# Patient Record
Sex: MALE | Race: BLACK OR AFRICAN AMERICAN | NOT HISPANIC OR LATINO | Employment: FULL TIME | URBAN - METROPOLITAN AREA
[De-identification: names, ages, dates, MRNs, and addresses within clinical notes are randomized per-mention and may not be internally consistent; named-entity substitution may affect disease eponyms.]

---

## 2018-01-10 NOTE — MISCELLANEOUS
Provider Comments  Provider Comments:   pt no showed today      Signatures   Electronically signed by : Ivana Sinclair, ; Mar  2 2016  4:13PM EST                       (Author)    Electronically signed by : Christoper Blizzard, MD; Mar  3 2016  1:15PM EST                       (Author)    Electronically signed by : Christoper Blizzard, MD; Mar  3 2016  1:15PM EST                       (Author)    Electronically signed by : GENOVEVA Bolton ; Mar  8 2016 10:06AM EST                       (Author)

## 2018-01-11 NOTE — MISCELLANEOUS
Message   Recorded as Task   Date: 05/16/2016 10:49 AM, Created By: Marcos Lawler   Task Name: Follow Up   Assigned To: Onesimo Cowart procedure,Team   Regarding Patient: Mike Sutton, Status: In Progress   Comment:    Cate Parmar - 16 May 2016 10:49 AM     TASK CREATED  Pt is S/P RIGHT SIJ INJ on 5/10/16 by Dr Rakan Lowe  No pain diary scanned in  No f/u scheduled   Gwen Dumont - 17 May 2016 2:09 PM     TASK IN [address] [place] [address] - 17 May 2016 2:12 PM     TASK EDITED  Spoke with pt  who reports a 90% improvement post injection  He states he did have some numbness in his right leg but this has gone away  Denies any s/s of infection  Jamie Garcia - 17 May 2016 3:00 PM     TASK REPLIED TO: Previously Assigned To Jamie Garcia md aware   will call pt with MRI results once that is done        Active Problems    1  Borderline hyperlipidemia (272 4) (E78 5)   2  Foraminal stenosis of lumbar region (724 02) (M99 83)   3  Fracture of lumbar vertebra (805 4) (S32 009A)   4  Low back pain (724 2) (M54 5)   5  Lumbar spondylolysis (738 4) (M43 06)   6  Mid back pain (724 5) (M54 9)   7  Musculoskeletal pain (729 1) (M79 1)   8  Other intervertebral disc displacement, lumbosacral region (722 10) (M51 27)   9  Radicular pain of left lower extremity (724 4) (M54 10)   10  Sacroiliitis (720 2) (M46 1)   11  Tobacco dependence (305 1) (F17 200)    Current Meds   1  Gabapentin 100 MG Oral Capsule; TAKE 1 CAPSULE 3 TIMES DAILY; Therapy: 45ZUA8876 to (Evaluate:05Jun2016); Last Rx:54Gxp6797 Ordered   2  Ibuprofen 600 MG Oral Tablet; Therapy: 60GQS7866 to Recorded   3  TiZANidine HCl - 4 MG Oral Tablet; TAKE 1 TABLET AT BEDTIME; Therapy: 56Kbi4834 to (Evaluate:24Jun2016)  Requested for: 29Vrk4503; Last   Rx:[id number] Ordered    Allergies    1  No Known Drug Allergies    2   No Known Environmental Allergies    Signatures   Electronically signed by : Ciera Moore, ; May 18 2016  1:51PM EST (Author)

## 2018-01-12 NOTE — MISCELLANEOUS
Message  Return to work or school:   Margarette Brian is under my professional care  He was seen in my office on 4/6/16    He is not able to return to work until Evaluated by specialist      Patient is under my care for medical reasons  He was unable to work October 7 until now  He will be seeing a specialist who will determine his future work capabilities  No work until seen/evaluated by specialist    Donzella Mccreedy Colletta Schmitt, MD       Signatures   Electronically signed by : Daniel Damon MD; Apr 6 2016 11:54AM EST                       (Author)    Electronically signed by : Daniel Damon MD; Apr 7 2016 12:54PM EST                       (Author)    Electronically signed by : Daniel Damon MD; Apr 7 2016 12:54PM EST                       (Author)    Electronically signed by : Daniel Damon MD; Apr 7 2016 12:55PM EST                       (Author)    Electronically signed by : Daniel Damon MD; Apr 7 2016 12:56PM EST                       (Author)    Electronically signed by : Daniel Damon MD; Apr 12 2016  3:28PM EST                       (Author)    Electronically signed by : Daniel Damon MD; Apr 12 2016  3:28PM EST                       (Author)    Electronically signed by : GENOVEVA Razo ; Apr 13 2016  9:25AM EST                       (Author)

## 2018-01-17 NOTE — MISCELLANEOUS
Message   Recorded as Task   Date: 05/18/2016 02:44 PM, Created By: Miguel Damon   Task Name: Follow Up   Assigned To: KENNEY Greco   Regarding Patient: Nabila Carter, Status: In Progress   CommentCoyamile Aaron - 18 May 2016 2:44 PM     TASK CREATED  discussed MRI results with pt   doing well after right SIJ injection   will f/u PRN   please mail copy of pt's MRI report to pt   Jennifer Nolasco - 18 May 2016 3:15 PM     TASK IN [facility] [employer] - 18 May 2016 3:17 PM     TASK EDITED  Copy of pt's MRI mailed to pt's home address  Active Problems    1  Borderline hyperlipidemia (272 4) (E78 5)   2  Foraminal stenosis of lumbar region (724 02) (M99 83)   3  Fracture of lumbar vertebra (805 4) (S32 009A)   4  Low back pain (724 2) (M54 5)   5  Lumbar spondylolysis (738 4) (M43 06)   6  Mid back pain (724 5) (M54 9)   7  Musculoskeletal pain (729 1) (M79 1)   8  Other intervertebral disc displacement, lumbosacral region (722 10) (M51 27)   9  Radicular pain of left lower extremity (724 4) (M54 10)   10  Sacroiliitis (720 2) (M46 1)   11  Tobacco dependence (305 1) (F17 200)    Current Meds   1  Gabapentin 100 MG Oral Capsule; TAKE 1 CAPSULE 3 TIMES DAILY; Therapy: 86YXG6309 to (Evaluate:05Jun2016); Last Rx:[id number] Ordered   2  Ibuprofen 600 MG Oral Tablet; Therapy: 47GIA4402 to Recorded   3  TiZANidine HCl - 4 MG Oral Tablet; TAKE 1 TABLET AT BEDTIME; Therapy: 12Xtk5052 to (Evaluate:24Jun2016)  Requested for: 48Tyu4228; Last   Rx:[id number] Ordered    Allergies    1  No Known Drug Allergies    2   No Known Environmental Allergies    Signatures   Electronically signed by : Valery Purvis, ; May 18 2016  3:17PM EST                       (Author)

## 2018-01-17 NOTE — PROGRESS NOTES
Assessment    1  Encounter for preventive health examination (V70 0) (Z00 00)   2  Borderline hyperlipidemia (272 4) (E78 5)   3  Low back pain (724 2) (M54 5)    Plan  Borderline hyperlipidemia    · (1) LIPID PANEL FASTING W DIRECT LDL REFLEX; Status:Active; Requested  for:17Hkj5558;    · (1) TSH; Status:Active; Requested for:82Cij5498; Health Maintenance, Low back pain    · (1) COMPREHENSIVE METABOLIC PANEL; Status:Active; Requested for:36Cla3472;   Low back pain    · (1) CBC/PLT/DIFF; Status:Active; Requested for:56Ocs9066;   Musculoskeletal pain    · (1) VITAMIN D 25-HYDROXY; Status:Active; Requested for:72Bdw9049;     Discussion/Summary  Impression: healthy adult male  Currently, he eats a healthy diet and has an adequate exercise regimen  Prostate cancer screening: the risks and benefits of prostate cancer screening were discussed and Order at age 50  Testicular cancer screening: the risks and benefits of testicular cancer screening were discussed and self testicular exam technique was taught  Screening lab work includes glucose, lipid profile and 25-hydroxyvitamin D  The risks and benefits of immunizations were discussed and immunizations are up to date  Advice and education were given regarding nutrition, aerobic exercise, weight bearing exercise, reproductive health, cardiovascular risk reduction, tobacco cessation, alcohol use, sunscreen use, self skin examination, helmet use and seat belt use  Patient discussion: discussed with the patient  Patient is a healthy male  He has stopped smoking cigarettes, which is excellent  We will check his cbc/cmp/tsh/lipids/vitamin D levels  Greg Dill will continue back pain regimen with Dr Iona Lr as well as myself  Will f/u w/ MRI results after available  Patient to f/u prn and requested Dr Zonia Willingham as his new PCP starting in July  Possible side effects of new medications were reviewed with the patient/guardian today     The patient was counseled regarding diagnostic results, instructions for management, risk factor reductions, prognosis, patient and family education, impressions, risks and benefits of treatment options, importance of compliance with treatment  Chief Complaint  Back Pain  Yearly Physical      History of Present Illness  HM, Adult Male: The patient is being seen for a health maintenance and Yearly physical evaluation  The last health maintenance visit was 3 year(s) ago  Social History: Household members include domestic partner  Work status: not currently employed  The patient quit smoking 4 weeks  He has 1ppd/10 years pack year(s) of cigarette use  He reports rare alcohol use  He has never used illicit drugs  General Health: The patient's health since the last visit is described as good  He has regular dental visits  He complains of vision problems  He has hearing loss  Immunizations status: up to date  Lifestyle:  He consumes a diverse and healthy diet  He does not have any weight concerns  He exercises regularly  He does not use tobacco  He consumes alcohol  He denies drug use  Reproductive health:  the patient is sexually active  birth control is not being practiced  He denies erectile dysfunction  Screening:   HPI: Here for yearly physical    Had MRI for low back pain and scaitica today, will f/u with results  Review of Systems    Constitutional: No fever or chills, feels well, no tiredness, no recent weight gain or weight loss  Eyes: No complaints of eye pain, no red eyes, no discharge from eyes, no itchy eyes  ENT: no complaints of earache, no hearing loss, no nosebleeds, no nasal discharge, no sore throat, no hoarseness  Cardiovascular: No complaints of slow heart rate, no fast heart rate, no chest pain, no palpitations, no leg claudication, no lower extremity  Respiratory: No complaints of shortness of breath, no wheezing, no cough, no SOB on exertion, no orthopnea or PND     Gastrointestinal: No complaints of abdominal pain, no constipation, no nausea or vomiting, no diarrhea or bloody stools  Genitourinary: No complaints of dysuria, no incontinence, no hesitancy, no nocturia, no genital lesion, no testicular pain  Musculoskeletal: as noted in HPI  Integumentary: No complaints of skin rash or skin lesions, no itching, no skin wound, no dry skin  Neurological: as noted in HPI  Psychiatric: Is not suicidal, no sleep disturbances, no anxiety or depression, no change in personality, no emotional problems  Endocrine: No complaints of proptosis, no hot flashes, no muscle weakness, no erectile dysfunction, no deepening of the voice, no feelings of weakness  Hematologic/Lymphatic: No complaints of swollen glands, no swollen glands in the neck, does not bleed easily, no easy bruising  ROS reviewed  Active Problems    1  Foraminal stenosis of lumbar region (724 02) (M99 83)   2  Fracture of lumbar vertebra (805 4) (S32 009A)   3  Low back pain (724 2) (M54 5)   4  Lumbar spondylolysis (738 4) (M43 06)   5  Mid back pain (724 5) (M54 9)   6  Other intervertebral disc displacement, lumbosacral region (722 10) (M51 27)   7  Radicular pain of left lower extremity (724 4) (M54 10)   8  Sacroiliitis (720 2) (M46 1)   9  Tobacco dependence (305 1) (F17 200)    Family History  Mother    · No pertinent family history    Social History    · Always uses seat belt   · Current every day smoker (305 1) (F17 200)   · No alcohol use   · No drug use    Current Meds   1  Gabapentin 100 MG Oral Capsule; TAKE 1 CAPSULE 3 TIMES DAILY; Therapy: 60MEH0889 to (Evaluate:05Jun2016); Last Rx:06Apr2016 Ordered   2  TiZANidine HCl - 4 MG Oral Tablet; TAKE 1 TABLET AT BEDTIME; Therapy: 43Dac2649 to (Evaluate:24Jun2016)  Requested for: 25Apr2016; Last   Rx:25Apr2016 Ordered    Allergies    1  No Known Drug Allergies    2   No Known Environmental Allergies    Vitals   Recorded: 20PFU6796 09:17AM   Temperature 97 8 F   Heart Rate 72 Respiration 14   Systolic 015   Diastolic 72   Height 6 ft 0 2 in   Weight 228 lb 2 08 oz   BMI Calculated 30 77   BSA Calculated 2 25   Pain Scale 5     Physical Exam    Constitutional   General appearance: No acute distress, well appearing and well nourished  Head and Face   Head and face: Normal     Palpation of the face and sinuses: No sinus tenderness  Eyes   Conjunctiva and lids: No erythema, swelling or discharge  Pupils and irises: Equal, round, reactive to light  Ophthalmoscopic examination: Normal fundi and optic discs  Ears, Nose, Mouth, and Throat   External inspection of ears and nose: Normal     Otoscopic examination: Tympanic membranes translucent with normal light reflex  Canals patent without erythema  Hearing: Normal     Nasal mucosa, septum, and turbinates: Normal without edema or erythema  Lips, teeth, and gums: Normal, good dentition  Oropharynx: Normal with no erythema, edema, exudate or lesions  Neck   Neck: Supple, symmetric, trachea midline, no masses  Thyroid: Normal, no thyromegaly  Pulmonary   Respiratory effort: No increased work of breathing or signs of respiratory distress  Palpation of chest: Normal     Auscultation of lungs: Clear to auscultation  Cardiovascular   Palpation of heart: Normal PMI, no thrills  Auscultation of heart: Normal rate and rhythm, normal S1 and S2, no murmurs  Carotid pulses: 2+ bilaterally  Examination of extremities for edema and/or varicosities: Normal     Chest   Chest: Normal     Abdomen   Abdomen: Non-tender, no masses  Liver and spleen: No hepatomegaly or splenomegaly  Examination for hernias: No hernias appreciated  Genitourinary   Penis: Normal, no lesions  Digital rectal exam of prostate: Normal size, no masses  Lymphatic   Palpation of lymph nodes in neck: No lymphadenopathy  Palpation of lymph nodes in groin: No lymphadenopathy      Musculoskeletal   Gait and station: Normal  Inspection/palpation of digits and nails: Normal without clubbing or cyanosis  Range of motion: Normal     Stability: Normal     Skin   Skin and subcutaneous tissue: Normal without rashes or lesions  Palpation of skin and subcutaneous tissue: Normal turgor  Neurologic   Cranial nerves: Cranial nerves 2-12 intact  Reflexes: 2+ and symmetric  Sensation: No sensory loss  Psychiatric   Judgment and insight: Normal     Orientation to person, place and time: Normal     Recent and remote memory: Intact  Mood and affect: Normal        Attending Note  Attending Note: Attending Note: I discussed the case with the Resident and reviewed the Resident's note, I supervised the Resident and I agree with the Resident management plan as it was presented to me  Level of Participation: I was present in clinic, but did not examine the patient  Comments/Additional Findings: LBP, no re flag sx, seeing pain mgmt  I agree with the Resident's note        Signatures   Electronically signed by : Zoya Mukherjee MD; May 18 2016  9:57AM EST                       (Author)    Electronically signed by : GENOVEVA Chaudhari ; May 18 2016  1:09PM EST                       (Author)

## 2019-01-22 ENCOUNTER — OFFICE VISIT (OUTPATIENT)
Dept: FAMILY MEDICINE CLINIC | Facility: CLINIC | Age: 49
End: 2019-01-22

## 2019-01-22 VITALS
BODY MASS INDEX: 30.13 KG/M2 | SYSTOLIC BLOOD PRESSURE: 118 MMHG | HEART RATE: 64 BPM | HEIGHT: 71 IN | RESPIRATION RATE: 16 BRPM | WEIGHT: 215.2 LBS | TEMPERATURE: 98.1 F | DIASTOLIC BLOOD PRESSURE: 78 MMHG

## 2019-01-22 DIAGNOSIS — Z77.21 EXPOSURE TO POTENTIALLY HAZARDOUS BODY FLUIDS: ICD-10-CM

## 2019-01-22 DIAGNOSIS — E66.09 CLASS 1 OBESITY DUE TO EXCESS CALORIES WITHOUT SERIOUS COMORBIDITY WITH BODY MASS INDEX (BMI) OF 30.0 TO 30.9 IN ADULT: ICD-10-CM

## 2019-01-22 DIAGNOSIS — F17.200 TOBACCO DEPENDENCE: Primary | ICD-10-CM

## 2019-01-22 PROBLEM — E66.811 CLASS 1 OBESITY DUE TO EXCESS CALORIES WITHOUT SERIOUS COMORBIDITY WITH BODY MASS INDEX (BMI) OF 30.0 TO 30.9 IN ADULT: Status: ACTIVE | Noted: 2019-01-22

## 2019-01-22 PROCEDURE — 99213 OFFICE O/P EST LOW 20 MIN: CPT | Performed by: FAMILY MEDICINE

## 2019-01-22 RX ORDER — TIZANIDINE 4 MG/1
1 TABLET ORAL
COMMUNITY
Start: 2016-04-25 | End: 2020-08-05

## 2019-01-22 RX ORDER — GABAPENTIN 100 MG/1
1 CAPSULE ORAL 3 TIMES DAILY
COMMUNITY
Start: 2015-10-28

## 2019-01-22 RX ORDER — IBUPROFEN 600 MG/1
TABLET ORAL
COMMUNITY
Start: 2016-03-08 | End: 2020-08-05

## 2019-01-22 NOTE — PROGRESS NOTES
Assessment/Plan: Fatimah Montejo is a 57 y o  male with:   Problem List Items Addressed This Visit        Other    Tobacco dependence - Primary     Discussed with patient the risks of continued smoking  Discussed his current use pattern 1 ppd  I advised patient to quit, and offered support  Patient expressed understanding, is pre-contemplative at this time  Asked patient to inform me when he sets a quit date  Offered assistance for whenever he is ready to quit, including counseling and/or pharmacotherapy  Resources discussed with and provided to the patient:  -1-800-QUIT-NOW Phone Number for [employer]  -[employer] ([address] [facility])            Class 1 obesity due to excess calories without serious comorbidity with body mass index (BMI) of 30 0 to 30 9 in adult     Wt Readings from Last 3 Encounters:   01/22/19 97 6 kg (215 lb 3 2 oz)   05/18/16 103 kg (228 lb 2 oz)   05/18/16 104 kg (230 lb)     BMI Counseling: Body mass index is 30 18 kg/m²  Discussed the patient's BMI with him  The BMI is above average  BMI counseling and education was provided to the patient  Nutrition recommendations include reducing portion sizes, decreasing overall calorie intake, 3-5 servings of fruits/vegetables daily, consuming healthier snacks, decreasing soda and/or juice intake, moderation in carbohydrate intake and reducing intake of cholesterol  Exercise recommendations include moderate aerobic physical activity for 150 minutes/week and exercising 3-5 times per week  Relevant Orders    Lipid Panel with Direct LDL reflex    Comprehensive metabolic panel    Exposure to potentially hazardous body fluids    Relevant Orders    Hepatitis B surface antigen    Chlamydia/GC amplified DNA by PCR    HIV 1/2 AG-AB combo          Chief Complaint:  Chief Complaint   Patient presents with    Establish Care     Need new PCP       HPI:   Fatimah Montejo is a 57 y o  male here to establish as new patient   Patient is a   Patient has not been following with a doctor in a few years and has not had any blood work done  Patient denies any chronic medical conditions or taking any medications  Patient wears glasses for near sightedness, denies any changes in prescription  Otherwise he's reports good health  He reports active lifestyle, but reports poor diet  He's a current 1PPD smoker 18 years  He drinks occasional beer and no marijuana or recreational drug use  Today, he is concerned because he thinks he may have come in contact with potentially hazardous bodily fluids  He is not willing to discuss in detail  He denies any active lesions or symptoms  Of note patient has chronic back pain secondary to fracture of lumbar vertebra, intervertebral disk injury, and after falling off a ladder, spinal stenosis  He used to follow up with orthopedic surgery, but reports overall pain is intermittent        History:   Past Medical History:   Diagnosis Date    Collapse of left lung     28     Social History   Substance Use Topics    Smoking status: Current Every Day Smoker     Packs/day: 1 00     Years: 1 00     Types: Cigarettes    Smokeless tobacco: Never Used    Alcohol use Yes      Comment: Pt "very little'     Patient Active Problem List   Diagnosis    Borderline hyperlipidemia    Foraminal stenosis of lumbar region    Fracture of lumbar vertebra (HCC)    Low back pain    Other intervertebral disc displacement, lumbosacral region    Tobacco dependence    Class 1 obesity due to excess calories without serious comorbidity with body mass index (BMI) of 30 0 to 30 9 in adult    Exposure to potentially hazardous body fluids       ROS:  As Per HPI    Physical Exam:  /78 (BP Location: Left arm, Patient Position: Sitting, Cuff Size: Adult)   Pulse 64   Temp 98 1 °F (36 7 °C) (Tympanic)   Resp 16   Ht 5' 10 8" (1 798 m)   Wt 97 6 kg (215 lb 3 2 oz)   BMI 30 18 kg/m²   Physical Exam Constitutional: He is oriented to person, place, and time  No distress  obese   HENT:   Head: Normocephalic and atraumatic  Right Ear: External ear normal    Left Ear: External ear normal    Nose: Nose normal    Mouth/Throat: Oropharynx is clear and moist    Eyes: Pupils are equal, round, and reactive to light  Conjunctivae are normal  Right eye exhibits no discharge  Left eye exhibits no discharge  Neck: Neck supple  No thyromegaly present  Cardiovascular: Normal rate, regular rhythm and normal heart sounds  No murmur heard  Pulmonary/Chest: Effort normal and breath sounds normal  No respiratory distress  He has no wheezes  He has no rales  Abdominal: Soft  Normal appearance and bowel sounds are normal  There is no tenderness  Musculoskeletal: He exhibits no edema or tenderness  Lymphadenopathy:     He has no cervical adenopathy  Neurological: He is alert and oriented to person, place, and time  Skin: Skin is warm and dry  He is not diaphoretic  Psychiatric: He has a normal mood and affect  Vitals reviewed          Future Appointments  Date Time Provider Department Center   2/12/2019 5:20 PM MD Hubert Doan MD

## 2019-01-22 NOTE — ASSESSMENT & PLAN NOTE
Wt Readings from Last 3 Encounters:   01/22/19 97 6 kg (215 lb 3 2 oz)   05/18/16 103 kg (228 lb 2 oz)   05/18/16 104 kg (230 lb)     BMI Counseling: Body mass index is 30 18 kg/m²  Discussed the patient's BMI with him  The BMI is above average  BMI counseling and education was provided to the patient  Nutrition recommendations include reducing portion sizes, decreasing overall calorie intake, 3-5 servings of fruits/vegetables daily, consuming healthier snacks, decreasing soda and/or juice intake, moderation in carbohydrate intake and reducing intake of cholesterol  Exercise recommendations include moderate aerobic physical activity for 150 minutes/week and exercising 3-5 times per week

## 2019-01-22 NOTE — ASSESSMENT & PLAN NOTE
Discussed with patient the risks of continued smoking  Discussed his current use pattern 1 ppd  I advised patient to quit, and offered support  Patient expressed understanding, is pre-contemplative at this time  Asked patient to inform me when he sets a quit date  Offered assistance for whenever he is ready to quit, including counseling and/or pharmacotherapy  Resources discussed with and provided to the patient:  -1-800-QUIT-NOW Phone Number for [employer]  -[employer] ([address]) Daily Assessment

## 2019-01-23 ENCOUNTER — TELEPHONE (OUTPATIENT)
Dept: FAMILY MEDICINE CLINIC | Facility: CLINIC | Age: 49
End: 2019-01-23

## 2019-01-23 DIAGNOSIS — Z77.21 EXPOSURE TO POTENTIALLY HAZARDOUS BODY FLUIDS: Primary | ICD-10-CM

## 2019-01-23 NOTE — TELEPHONE ENCOUNTER
Spoke with patient on the phone at [phone], at appriximately 11:55 am today and updated him regarding pending lab work

## 2019-01-29 LAB
ALBUMIN SERPL-MCNC: 4 G/DL (ref 3.6–5.1)
ALBUMIN/GLOB SERPL: 1.5 (CALC) (ref 1–2.5)
ALP SERPL-CCNC: 79 U/L (ref 40–115)
ALT SERPL-CCNC: 11 U/L (ref 9–46)
AST SERPL-CCNC: 13 U/L (ref 10–40)
BILIRUB SERPL-MCNC: 0.4 MG/DL (ref 0.2–1.2)
BUN SERPL-MCNC: 9 MG/DL (ref 7–25)
BUN/CREAT SERPL: ABNORMAL (CALC) (ref 6–22)
C TRACH RRNA SPEC QL NAA+PROBE: NOT DETECTED
CALCIUM SERPL-MCNC: 9.3 MG/DL (ref 8.6–10.3)
CHLORIDE SERPL-SCNC: 106 MMOL/L (ref 98–110)
CHOLEST SERPL-MCNC: 142 MG/DL
CHOLEST/HDLC SERPL: 4.1 (CALC)
CO2 SERPL-SCNC: 30 MMOL/L (ref 20–32)
CREAT SERPL-MCNC: 1.1 MG/DL (ref 0.6–1.35)
GLOBULIN SER CALC-MCNC: 2.7 G/DL (CALC) (ref 1.9–3.7)
GLUCOSE SERPL-MCNC: 100 MG/DL (ref 65–99)
HBV SURFACE AG SERPL QL IA: NORMAL
HDLC SERPL-MCNC: 35 MG/DL
HIV 1+2 AB+HIV1 P24 AG SERPL QL IA: NORMAL
LDLC SERPL CALC-MCNC: 92 MG/DL (CALC)
N GONORRHOEA RRNA SPEC QL NAA+PROBE: NOT DETECTED
NONHDLC SERPL-MCNC: 107 MG/DL (CALC)
POTASSIUM SERPL-SCNC: 4.4 MMOL/L (ref 3.5–5.3)
PROT SERPL-MCNC: 6.7 G/DL (ref 6.1–8.1)
SL AMB EGFR AFRICAN AMERICAN: 93 ML/MIN/1.73M2
SL AMB EGFR NON AFRICAN AMERICAN: 80 ML/MIN/1.73M2
SODIUM SERPL-SCNC: 141 MMOL/L (ref 135–146)
TRIGL SERPL-MCNC: 61 MG/DL

## 2019-02-19 ENCOUNTER — OFFICE VISIT (OUTPATIENT)
Dept: FAMILY MEDICINE CLINIC | Facility: CLINIC | Age: 49
End: 2019-02-19

## 2019-02-19 VITALS
DIASTOLIC BLOOD PRESSURE: 70 MMHG | HEIGHT: 71 IN | WEIGHT: 221.8 LBS | HEART RATE: 82 BPM | SYSTOLIC BLOOD PRESSURE: 122 MMHG | BODY MASS INDEX: 31.05 KG/M2 | RESPIRATION RATE: 18 BRPM | TEMPERATURE: 98.5 F

## 2019-02-19 DIAGNOSIS — F17.200 TOBACCO DEPENDENCE: ICD-10-CM

## 2019-02-19 DIAGNOSIS — Z23 ENCOUNTER FOR IMMUNIZATION: ICD-10-CM

## 2019-02-19 DIAGNOSIS — E78.5 BORDERLINE HYPERLIPIDEMIA: ICD-10-CM

## 2019-02-19 DIAGNOSIS — M54.5 LOW BACK PAIN, UNSPECIFIED BACK PAIN LATERALITY, UNSPECIFIED CHRONICITY, WITH SCIATICA PRESENCE UNSPECIFIED: ICD-10-CM

## 2019-02-19 DIAGNOSIS — Z00.00 HEALTHCARE MAINTENANCE: Primary | ICD-10-CM

## 2019-02-19 PROCEDURE — 90715 TDAP VACCINE 7 YRS/> IM: CPT | Performed by: FAMILY MEDICINE

## 2019-02-19 PROCEDURE — 99396 PREV VISIT EST AGE 40-64: CPT | Performed by: FAMILY MEDICINE

## 2019-02-19 PROCEDURE — 90471 IMMUNIZATION ADMIN: CPT | Performed by: FAMILY MEDICINE

## 2019-02-19 NOTE — ASSESSMENT & PLAN NOTE
Patient continues to make progress, has cut down from 1 ppd to 0 5 PPD  He's committed quitting by next visit in 3 months  Patient expressed understanding, contemplative at this time  Patient counseled, pharmacotherapy options reviewed      Resources discussed with and provided to the patient:  -1-800-QUIT-NOW Phone Number for [employer] Hotline  -www [employer] ([address])

## 2019-02-19 NOTE — ASSESSMENT & PLAN NOTE
Chronic  Likely secondary to chronic back injuries, including most recent "fall off ladder" in 2016  Patient also has Foraminal stenosis of Lumbar region and history of lumbar vertebral fracture   -Continue current Tylenol and Ibuprofen PRN  -Patient is established with Dr Enrico Garcia, but has not followed up recently     RTC in 3 months

## 2019-02-19 NOTE — PROGRESS NOTES
Assessment and Plan:    57 y o  male exam      1  Health Maintenance  -Colonoscopy: Denies any personal or family history of colon cancer  -Labs: Labs UTD as of 01/2019  -Immunizations: Tdap given today  Patient refuses Flushot, (he's never had a flu shot in his life - doesn't believe in it)    2  Discussed the patient's BMI with him, (Body mass index is 31 11 kg/m²  )  Advised a healthy, balanced diet and regular exercise for 30 minutes 4-5 times a week  3  Patient Counseling:   -Nutrition: Stressed importance of moderation in sodium/caffeine intake, saturated fat and cholesterol, caloric balance, sufficient intake of fresh fruits, vegetables, fiber, calcium, and iron  -Exercise: Stressed the importance of regular exercise    -Substance Abuse: Discussed cessation tobacco (currently 1PPD), alcohol, or other drug use; driving or other dangerous activities under the influence; availability of treatment for abuse  -Dental health: Does not follow with a dentist  He states he will establish with one soon  Discussed importance of regular tooth brushing, flossing, and dental visits  4  Other diagnoses addressed today:   Borderline hyperlipidemia  Resolved  Last FLP 01/2019, no significant abnormalities  Tobacco dependence  Patient continues to make progress, has cut down from 1 ppd  to 0 5 PPD  He's committed quitting by next visit in 3 months  Patient expressed understanding, contemplative at this time  Patient counseled, pharmacotherapy options reviewed      Resources discussed with and provided to the patient:  -1-800-QUIT-NOW Phone Number for [employer] Hotline  -www [employer] ([address] [facility])     Low back pain  Chronic  Likely secondary to chronic back injuries, including most recent "fall off ladder" in 2016   Patient also has Foraminal stenosis of Lumbar region and history of lumbar vertebral fracture   -Continue current Tylenol and Ibuprofen PRN  -Patient is established with   Efrem Singh, but has not followed up recently  RTC in 3 months        Return for annual physical in one year  Subjective:  Sarath White is a 57 y o  male and is here for a comprehensive physical exam    Acute Complaints: None    I have reviewed the patient's PMH, Social History, Medication List and Allergies  Review of Systems:  Review of Systems   Constitutional: Negative for chills and fever  HENT: Negative for hearing loss  Eyes: Negative for visual disturbance  Respiratory: Negative for cough, shortness of breath and wheezing  Cardiovascular: Negative for chest pain, palpitations and leg swelling  Gastrointestinal: Negative for abdominal pain, constipation and diarrhea  Endocrine: Negative for polyuria  Genitourinary: Negative for difficulty urinating  Musculoskeletal: Positive for arthralgias  Skin: Negative for rash  Neurological: Negative for weakness and headaches  Psychiatric/Behavioral: Negative for behavioral problems, dysphoric mood and sleep disturbance  PHQ-9 Depression Screening    PHQ-9:    Frequency of the following problems over the past two weeks:       Little interest or pleasure in doing things:  0 - not at all  Feeling down, depressed, or hopeless:  0 - not at all  PHQ-2 Score:  0         Objective:  /70   Pulse 82   Temp 98 5 °F (36 9 °C)   Resp 18   Ht 5' 10 8" (1 798 m)   Wt 101 kg (221 lb 12 8 oz)   BMI 31 11 kg/m²   Physical Exam   Constitutional: He is oriented to person, place, and time  He appears well-developed and well-nourished  No distress  HENT:   Head: Normocephalic and atraumatic  Right Ear: External ear normal    Left Ear: External ear normal    Nose: Nose normal    Mouth/Throat: Oropharynx is clear and moist    Eyes: Pupils are equal, round, and reactive to light  Conjunctivae and EOM are normal  Right eye exhibits no discharge  Left eye exhibits no discharge  No scleral icterus  Neck: Normal range of motion  Neck supple  Cardiovascular: Normal rate, regular rhythm and normal heart sounds  Exam reveals no gallop and no friction rub  No murmur heard  Pulmonary/Chest: Effort normal and breath sounds normal  No respiratory distress  He has no wheezes  Abdominal: Soft  Bowel sounds are normal  He exhibits no distension  There is no tenderness  There is no guarding  Musculoskeletal: Normal range of motion  He exhibits no tenderness  Lymphadenopathy:     He has no cervical adenopathy  Neurological: He is alert and oriented to person, place, and time  No cranial nerve deficit  He exhibits normal muscle tone  Coordination normal    Skin: Skin is warm and dry  No rash noted  He is not diaphoretic  No pallor             Patient Care Team:  Mary Lerma MD as PCP - General (Family Medicine)    Mary Lerma MD

## 2019-02-20 PROBLEM — Z00.00 HEALTHCARE MAINTENANCE: Status: ACTIVE | Noted: 2019-02-20

## 2020-08-05 ENCOUNTER — OFFICE VISIT (OUTPATIENT)
Dept: FAMILY MEDICINE CLINIC | Facility: CLINIC | Age: 50
End: 2020-08-05

## 2020-08-05 VITALS
DIASTOLIC BLOOD PRESSURE: 80 MMHG | TEMPERATURE: 98.4 F | BODY MASS INDEX: 33.44 KG/M2 | RESPIRATION RATE: 18 BRPM | HEART RATE: 72 BPM | SYSTOLIC BLOOD PRESSURE: 110 MMHG | WEIGHT: 238.4 LBS
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
## 2020-08-05 DIAGNOSIS — M51.27 OTHER INTERVERTEBRAL DISC DISPLACEMENT, LUMBOSACRAL REGION: ICD-10-CM

## 2020-08-05 DIAGNOSIS — M25.512 ACUTE PAIN OF LEFT SHOULDER: Primary | ICD-10-CM

## 2020-08-05 PROCEDURE — 99213 OFFICE O/P EST LOW 20 MIN: CPT | Performed by: PHYSICIAN ASSISTANT

## 2020-08-05 RX ORDER — METHOCARBAMOL 750 MG/1
750 TABLET, FILM COATED ORAL 4 TIMES DAILY PRN
COMMUNITY
Start: 2020-07-23

## 2020-08-05 RX ORDER — MELOXICAM 15 MG/1
15 TABLET ORAL DAILY PRN
Qty: 30 TABLET | Refills: 1 | Status: SHIPPED | OUTPATIENT
Start: 2020-08-05 | End: 2020-09-09
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
## 2020-08-05 NOTE — LETTER
August 5, 2020     Patient: Zhanna Issa   YOB: 1970   Date of Visit: 8/5/2020       To Whom it May Concern:    Zhanna Issa is under my professional care  He was seen in my office on 8/5/2020  He may return to work on 8/10/2020  If you have any questions or concerns, please don't hesitate to call           Sincerely,          [employer] PA-C        CC: No Recipients

## 2020-08-06 NOTE — ASSESSMENT & PLAN NOTE
Will refer to PT to eval and treat  Ice/heat prn  Stop motrin, trial of mobic 15 mg po qd prn  Limit excessive use of left arm

## 2020-08-17 ENCOUNTER — EVALUATION (OUTPATIENT)
Dept: PHYSICAL THERAPY | Facility: REHABILITATION | Age: 50
End: 2020-08-17

## 2020-08-17 DIAGNOSIS — M25.512 ACUTE PAIN OF LEFT SHOULDER: ICD-10-CM

## 2020-08-17 NOTE — PROGRESS NOTES
Pt arrived without WC information and high copay/deductible if paying OOB and through personal insurance  Pt's evaluation was placed on hold to resume once we determine if we are on patient's WC panel

## 2020-09-09 ENCOUNTER — OFFICE VISIT (OUTPATIENT)
Dept: FAMILY MEDICINE CLINIC | Facility: CLINIC | Age: 50
End: 2020-09-09

## 2020-09-09 VITALS
WEIGHT: 241 LBS | HEIGHT: 71 IN | RESPIRATION RATE: 18 BRPM | BODY MASS INDEX: 33.74 KG/M2 | HEART RATE: 76 BPM | DIASTOLIC BLOOD PRESSURE: 70 MMHG | TEMPERATURE: 97 F | SYSTOLIC BLOOD PRESSURE: 124 MMHG

## 2020-09-09 DIAGNOSIS — M25.512 ACUTE PAIN OF LEFT SHOULDER: Primary | ICD-10-CM

## 2020-09-09 PROCEDURE — 3725F SCREEN DEPRESSION PERFORMED: CPT | Performed by: PHYSICIAN ASSISTANT

## 2020-09-09 PROCEDURE — 99213 OFFICE O/P EST LOW 20 MIN: CPT | Performed by: PHYSICIAN ASSISTANT

## 2020-09-09 PROCEDURE — 3008F BODY MASS INDEX DOCD: CPT | Performed by: PHYSICIAN ASSISTANT

## 2020-09-09 RX ORDER — NAPROXEN 500 MG/1
500 TABLET ORAL 2 TIMES DAILY WITH MEALS
Qty: 30 TABLET | Refills: 0 | Status: SHIPPED | OUTPATIENT
Start: 2020-09-09

## 2020-09-09 NOTE — ASSESSMENT & PLAN NOTE
Sahil scheduled patient for follow-up appointment with Dasha Forrest while in office for 9/15/2020 at 945am  D/c mobic ,start Naproxen 500 mg po BID prn pain   Ice/heat prn

## 2020-09-09 NOTE — LETTER
September 9, 2020     Patient: Sarath White   YOB: 1970   Date of Visit: 9/9/2020       To Whom it May Concern:    Sarath White is under my professional care  He was seen in my office on 9/9/2020  He may return to work on 9/14/2020  If you have any questions or concerns, please don't hesitate to call           Sincerely,          Phil Charles PA-C        CC: No Recipients
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
## 2020-09-21 ENCOUNTER — CONSULT (OUTPATIENT)
Dept: PAIN MEDICINE | Facility: CLINIC | Age: 50
End: 2020-09-21
Payer: COMMERCIAL

## 2020-09-21 ENCOUNTER — TRANSCRIBE ORDERS (OUTPATIENT)
Dept: PAIN MEDICINE | Facility: CLINIC | Age: 50
End: 2020-09-21

## 2020-09-21 VITALS
SYSTOLIC BLOOD PRESSURE: 129 MMHG | DIASTOLIC BLOOD PRESSURE: 83 MMHG | HEART RATE: 71 BPM | WEIGHT: 241 LBS | BODY MASS INDEX: 33.8 KG/M2 | TEMPERATURE: 98.6 F

## 2020-09-21 DIAGNOSIS — M46.1 SACROILIITIS (HCC): Primary | ICD-10-CM

## 2020-09-21 DIAGNOSIS — M51.27 OTHER INTERVERTEBRAL DISC DISPLACEMENT, LUMBOSACRAL REGION: ICD-10-CM

## 2020-09-21 DIAGNOSIS — M51.16 INTERVERTEBRAL DISC DISORDER WITH RADICULOPATHY OF LUMBAR REGION: ICD-10-CM

## 2020-09-21 PROCEDURE — 99244 OFF/OP CNSLTJ NEW/EST MOD 40: CPT | Performed by: ANESTHESIOLOGY
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
## 2020-09-21 NOTE — PATIENT INSTRUCTIONS

## 2020-10-05 ENCOUNTER — HOSPITAL ENCOUNTER (OUTPATIENT)
Dept: MRI IMAGING | Facility: HOSPITAL | Age: 50
Discharge: HOME | End: 2020-10-05
Attending: ANESTHESIOLOGY
Payer: COMMERCIAL

## 2020-10-05 DIAGNOSIS — M51.16 INTERVERTEBRAL DISC DISORDER WITH RADICULOPATHY OF LUMBAR REGION: ICD-10-CM

## 2020-10-05 PROCEDURE — G1004 CDSM NDSC: HCPCS

## 2020-10-05 PROCEDURE — 72148 MRI LUMBAR SPINE W/O DYE: CPT

## 2020-10-06 ENCOUNTER — TRANSCRIBE ORDERS (OUTPATIENT)
Dept: PAIN MEDICINE | Facility: CLINIC | Age: 50
End: 2020-10-06

## 2020-10-08 ENCOUNTER — TELEPHONE (OUTPATIENT)
Dept: RADIOLOGY | Facility: CLINIC | Age: 50
End: 2020-10-08

## 2020-10-08 ENCOUNTER — TRANSCRIBE ORDERS (OUTPATIENT)
Dept: PAIN MEDICINE | Facility: CLINIC | Age: 50
End: 2020-10-08

## 2020-10-08 ENCOUNTER — HOSPITAL ENCOUNTER (OUTPATIENT)
Dept: RADIOLOGY | Facility: CLINIC | Age: 50
Discharge: HOME | End: 2020-10-08
Attending: ANESTHESIOLOGY | Admitting: ANESTHESIOLOGY
Payer: COMMERCIAL

## 2020-10-08 VITALS
HEART RATE: 56 BPM | RESPIRATION RATE: 18 BRPM | TEMPERATURE: 98.3 F | DIASTOLIC BLOOD PRESSURE: 77 MMHG | SYSTOLIC BLOOD PRESSURE: 123 MMHG | OXYGEN SATURATION: 98 %

## 2020-10-08 DIAGNOSIS — M46.1 SACROILIITIS (HCC): ICD-10-CM

## 2020-10-08 PROCEDURE — 27096 INJECT SACROILIAC JOINT: CPT | Performed by: ANESTHESIOLOGY

## 2020-10-08 RX ORDER — BUPIVACAINE HCL/PF 2.5 MG/ML
10 VIAL (ML) INJECTION ONCE
Status: COMPLETED | OUTPATIENT
Start: 2020-10-08 | End: 2020-10-08

## 2020-10-08 RX ORDER — 0.9 % SODIUM CHLORIDE 0.9 %
10 VIAL (ML) INJECTION ONCE
Status: COMPLETED | OUTPATIENT
Start: 2020-10-08 | End: 2020-10-08

## 2020-10-08 RX ORDER — METHYLPREDNISOLONE ACETATE 80 MG/ML
80 INJECTION, SUSPENSION INTRA-ARTICULAR; INTRALESIONAL; INTRAMUSCULAR; PARENTERAL; SOFT TISSUE ONCE
Status: COMPLETED | OUTPATIENT
Start: 2020-10-08 | End: 2020-10-08

## 2020-10-08 RX ADMIN — IOHEXOL 1 ML: 300 INJECTION, SOLUTION INTRAVENOUS at 14:47

## 2020-10-08 RX ADMIN — SODIUM CHLORIDE 3 ML: 9 INJECTION, SOLUTION INTRAMUSCULAR; INTRAVENOUS; SUBCUTANEOUS at 14:46

## 2020-10-08 RX ADMIN — BUPIVACAINE HYDROCHLORIDE 3 ML: 2.5 INJECTION, SOLUTION EPIDURAL; INFILTRATION; INTRACAUDAL at 14:47

## 2020-10-08 RX ADMIN — METHYLPREDNISOLONE ACETATE 80 MG: 80 INJECTION, SUSPENSION INTRA-ARTICULAR; INTRALESIONAL; INTRAMUSCULAR; PARENTERAL; SOFT TISSUE at 14:47

## 2020-10-08 RX ADMIN — Medication 3 ML: at 14:46

## 2020-10-14 DIAGNOSIS — M25.512 ACUTE PAIN OF LEFT SHOULDER: ICD-10-CM

## 2020-10-14 RX ORDER — NAPROXEN 500 MG/1
TABLET ORAL
Qty: 30 TABLET | Refills: 0 | OUTPATIENT
Start: 2020-10-14

## 2020-10-15 ENCOUNTER — TELEPHONE (OUTPATIENT)
Dept: PAIN MEDICINE | Facility: CLINIC | Age: 50
End: 2020-10-15

## 2022-03-25 ENCOUNTER — OFFICE VISIT (OUTPATIENT)
Dept: URGENT CARE | Facility: CLINIC | Age: 52
End: 2022-03-25
Payer: COMMERCIAL

## 2022-03-25 VITALS
DIASTOLIC BLOOD PRESSURE: 69 MMHG | OXYGEN SATURATION: 96 % | HEART RATE: 59 BPM | RESPIRATION RATE: 18 BRPM | SYSTOLIC BLOOD PRESSURE: 124 MMHG | TEMPERATURE: 97.2 F

## 2022-03-25 DIAGNOSIS — K04.7 DENTAL INFECTION: ICD-10-CM

## 2022-03-25 DIAGNOSIS — K08.89 PAIN, DENTAL: Primary | ICD-10-CM

## 2022-03-25 PROCEDURE — G0381 LEV 2 HOSP TYPE B ED VISIT: HCPCS | Performed by: PHYSICIAN ASSISTANT

## 2022-03-25 RX ORDER — LIDOCAINE HYDROCHLORIDE 20 MG/ML
SOLUTION OROPHARYNGEAL
Qty: 15 ML | Refills: 0 | Status: SHIPPED | OUTPATIENT
Start: 2022-03-25

## 2022-03-25 RX ORDER — PENICILLIN V POTASSIUM 500 MG/1
500 TABLET ORAL EVERY 6 HOURS SCHEDULED
Qty: 28 TABLET | Refills: 0 | Status: SHIPPED | OUTPATIENT
Start: 2022-03-25 | End: 2022-04-01

## 2022-03-25 RX ORDER — IBUPROFEN 600 MG/1
600 TABLET ORAL EVERY 6 HOURS PRN
Qty: 30 TABLET | Refills: 0 | Status: SHIPPED | OUTPATIENT
Start: 2022-03-25

## 2022-03-25 NOTE — PATIENT INSTRUCTIONS
Apply lidocaine gel on and around tooth every 2-3 hours as needed for pain control  Take anti-inflammatory prescription as needed for pain control  Take antibiotic as instructed  Call and get in with a dentist as soon as possible  Information given on [facility]  If significant worsening of pain, proceed to emergency room for further evaluation and treatment

## 2022-03-25 NOTE — PROGRESS NOTES
[facility]    NAME: Evon Laurent is a 54 y o  male  : 1970    MRN: [id number]  DATE: 2022  TIME: 2:29 PM    Assessment and Plan   Pain, dental [K08 89]  1  Pain, dental  ibuprofen (MOTRIN) 600 mg tablet    Lidocaine Viscous HCl (XYLOCAINE) 2 % mucosal solution   2  Dental infection  penicillin V potassium (VEETID) 500 mg tablet       Patient Instructions   Patient Instructions   Apply lidocaine gel on and around tooth every 2-3 hours as needed for pain control  Take anti-inflammatory prescription as needed for pain control  Take antibiotic as instructed  Call and get in with a dentist as soon as possible  Information given on [facility]  If significant worsening of pain, proceed to emergency room for further evaluation and treatment  Chief Complaint     Chief Complaint   Patient presents with    Dental Pain     Right lower mouth with fratured tooth   Pain for 2 weeks  History of Present Illness   Evon Laurent presents to the clinic c/o  72-year-old male comes in with right lower dental pain that started about 2 weeks ago  Has not really been doing anything to try and help himself  Currently does not have insurance and has not contacted dentist   Denies any fever or chills  Some of the discomfort goes up into his jaw and temple area  History of prior dental work and dental problems  Review of Systems   Review of Systems   Constitutional: Negative  HENT: Positive for dental problem          Current Medications     Long-Term Medications   Medication Sig Dispense Refill    gabapentin (NEURONTIN) 100 mg capsule Take 1 capsule by mouth 3 (three) times a day (Patient not taking: Reported on 3/25/2022 )      ibuprofen (MOTRIN) 600 mg tablet Take 1 tablet (600 mg total) by mouth every 6 (six) hours as needed for moderate pain 30 tablet 0    methocarbamol (ROBAXIN) 750 mg tablet Take 750 mg by mouth 4 (four) times a day as needed (Patient not taking: Reported on 3/25/2022 )      naproxen (NAPROSYN) 500 mg tablet Take 1 tablet (500 mg total) by mouth 2 (two) times a day with meals (Patient not taking: Reported on 10/8/2020) 30 tablet 0       Current Allergies     Allergies as of 03/25/2022 - Reviewed 03/25/2022   Allergen Reaction Noted    Bee venom Anaphylaxis 01/22/2019          The following portions of the patient's history were reviewed and updated as appropriate: allergies, current medications, past family history, past medical history, past social history, past surgical history and problem list   Past Medical History:   Diagnosis Date    Collapse of left lung     1987     Past Surgical History:   Procedure Laterality Date    BACK SURGERY       Family History   Problem Relation Age of Onset    Cancer Mother     Diabetes Mother     Cancer Father     Throat cancer Maternal Grandmother        Objective   /69   Pulse 59   Temp (!) 97 2 °F (36 2 °C) (Tympanic)   Resp 18   SpO2 96%   No LMP for male patient  Physical Exam     Physical Exam  Vitals and nursing note reviewed  Constitutional:       General: He is not in acute distress  Appearance: Normal appearance  He is well-developed  He is not ill-appearing, toxic-appearing or diaphoretic  Comments: Accompanied by significant other   HENT:      Head:      Comments: Mild swelling noted right lower jawline  TTP over right mandibular region with mild swelling     Mouth/Throat:      Comments: Dental caries and split tooth with surrounding gingival redness and swelling  No obvious abscess  Right lower molar premolar region  Cardiovascular:      Rate and Rhythm: Normal rate and regular rhythm  Pulmonary:      Effort: Pulmonary effort is normal    Musculoskeletal:      Cervical back: Normal range of motion and neck supple  No rigidity or tenderness  Lymphadenopathy:      Cervical: No cervical adenopathy  Skin:     General: Skin is warm and dry     Neurological: Mental Status: He is alert and oriented to person, place, and time

## 2023-01-31 ENCOUNTER — OFFICE VISIT (OUTPATIENT)
Dept: FAMILY MEDICINE CLINIC | Facility: CLINIC | Age: 53
End: 2023-01-31

## 2023-01-31 VITALS
HEART RATE: 64 BPM | DIASTOLIC BLOOD PRESSURE: 82 MMHG | BODY MASS INDEX: 31.92 KG/M2 | SYSTOLIC BLOOD PRESSURE: 127 MMHG | TEMPERATURE: 97.4 F | WEIGHT: 227.6 LBS | RESPIRATION RATE: 28 BRPM

## 2023-01-31 DIAGNOSIS — N50.82 SCROTAL PAIN: Primary | ICD-10-CM

## 2023-01-31 LAB
BACTERIA UR QL AUTO: ABNORMAL /HPF
BILIRUB UR QL STRIP: NEGATIVE
CLARITY UR: CLEAR
COLOR UR: YELLOW
GLUCOSE UR STRIP-MCNC: NEGATIVE MG/DL
HGB UR QL STRIP.AUTO: ABNORMAL
KETONES UR STRIP-MCNC: NEGATIVE MG/DL
LEUKOCYTE ESTERASE UR QL STRIP: ABNORMAL
NITRITE UR QL STRIP: NEGATIVE
NON-SQ EPI CELLS URNS QL MICRO: ABNORMAL /HPF
PH UR STRIP.AUTO: 6.5 [PH]
PROT UR STRIP-MCNC: ABNORMAL MG/DL
RBC #/AREA URNS AUTO: ABNORMAL /HPF
SP GR UR STRIP.AUTO: 1.02 (ref 1–1.03)
UROBILINOGEN UR STRIP-ACNC: <2 MG/DL
WBC #/AREA URNS AUTO: ABNORMAL /HPF

## 2023-01-31 RX ORDER — CEFTRIAXONE 500 MG/1
500 INJECTION, POWDER, FOR SOLUTION INTRAMUSCULAR; INTRAVENOUS ONCE
Status: COMPLETED | OUTPATIENT
Start: 2023-01-31 | End: 2023-01-31

## 2023-01-31 RX ORDER — LEVOFLOXACIN 500 MG/1
500 TABLET, FILM COATED ORAL EVERY 24 HOURS
Qty: 10 TABLET | Refills: 0 | Status: SHIPPED | OUTPATIENT
Start: 2023-01-31 | End: 2023-02-10

## 2023-01-31 RX ADMIN — CEFTRIAXONE 500 MG: 500 INJECTION, POWDER, FOR SOLUTION INTRAMUSCULAR; INTRAVENOUS at 14:27

## 2023-01-31 NOTE — PROGRESS NOTES
Name: Ramo Whitney      : 1970      MRN: [id number]  Encounter Provider: Carlin Khan MD  Encounter Date: 2023   Encounter department: [address] [place] [address]  Scrotal pain  Assessment & Plan:  Patient reports acute onset left scrotal pain and swelling and pain after stepping off his truck at around 5:30AM yesterday (23) after many heavy liftings  Reports was bending down to  a large container and immediately felt sharp pain in his left groin a/w burning sensation which lasted for approx 10 seconds and improves since  Burning has completely resolved and pain/swelling has mildly improved with naproxen and ice compress    - Patient works as a  for [employer] requiring frequent heavy lifting  Otherwise denies H/O surgery, trauma, or STD    - Evaluated by [facility] for workman's comp on 23 and was ordered US scrotum/testes which showed a 1 7 cm left epididymal cyst with possible left epididymitis  - Endorses yellow/mucousy sperm during intercourse last night but denies any blood in his ejaculate  Otherwise denies any inguinal mass, fever, chill, painful ejaculation, penile discharge,  rashes, dysuria, urinary frequency/hesitancy, suprapubic pain, or abdominal pain  - Currently sexually active with his recently  wife without consistent condom use, however had high risk sexual behavior in the past      exam (with chaperone): Left side of scrotum appear more swollen than right  No erythema, ecchymosis, or rash noted  No increased warmth noted  No tenderness over right testes or epididymis  No spermatic cord tenderness   (+) Mild tenderness over left scrotum   (+) Marked tenderness over left epididymis  Penis normal on exam   Cremasteric reflex intact  No inguinal hernia or lymphadenopathy noted      Assessment: Presentation and exam consistent with left epididymitis, suspected secondary to bacterial infection, which would likely be enteric organism (E  Coli) for age greater than 28  Could also be from irritation due to left epididymal cyst   However, due to high risk sexual behavior in the past, patient elected to be empirically treated for GC chlamydia at this time  Plan:  - UA and GC/chlamydia collected prior to ABX  - Ceftriaxone 500 mg IM x1 dose for weight <150 kg  - Levofloxacin 500 mg daily x10 days  - ED precaution reviewed  - Follow-up in 10 days to reevaluate    Orders:  -     cefTRIAXone (ROCEPHIN) injection 500 mg  -     UA w Reflex to Microscopic w Reflex to Culture - Clinic Collect  -     Chlamydia/GC amplified DNA by PCR; Future  -     levofloxacin (LEVAQUIN) 500 mg tablet; Take 1 tablet (500 mg total) by mouth every 24 hours for 10 days  -     Chlamydia/GC amplified DNA by PCR  -     Urine Microscopic  -     Urine culture     Follow-up: Return in about 2 weeks (around 2/14/2023) for left scrotal pain  Subjective      Chief Complaint   Patient presents with   • Cyst     HPI   61-year-old male presents for initial evaluation of left scrotal swelling and pain  Denies H/O surgery, trauma, or STD  Patient works as a  for [employer] requiring frequent heavy lifting  Patient reports acute onset left scrotal pain and swelling and pain after stepping off his truck at around 5:30AM yesterday (1/30/23) after many heavy liftings  Patient reports was bending down to  a large container and immediately felt pain in his left groin a/w burning sensation which lasted for approx 10 seconds and improves since  Burning has completely resolved and pain/swelling has mildly improved with naproxen and ice compress  Patient went to [facility] for workman's comp 1/30/23 and was ordered US scrotum/testes which showed a 1 7 cm left epididymal cyst with possible left epididymitis    Patient also endorses yellow/mucousy sperm during intercourse last night but denies any blood in his ejaculate  Otherwise denies any inguinal mass, fever, chill, painful ejaculation, penile discharge,  rashes, dysuria, urinary frequency/hesitancy, suprapubic pain, or abdominal pain  Patient currently sexually active with his recently  wife without consistent condom use, however had high risk sexual behavior in the past     Review of Systems   Constitutional: Negative for chills and fever  HENT: Negative for trouble swallowing  Eyes: Negative for pain and redness  Respiratory: Negative for shortness of breath  Cardiovascular: Negative for chest pain  Gastrointestinal: Negative for abdominal pain  Endocrine: Negative for cold intolerance and heat intolerance  Genitourinary: Positive for scrotal swelling (left scrotal pain and swelling) and testicular pain  Negative for decreased urine volume, difficulty urinating, dysuria, enuresis, flank pain, frequency, genital sores, hematuria, penile discharge, penile pain, penile swelling and urgency  Musculoskeletal: Negative for arthralgias and myalgias  Skin: Negative for wound  Neurological: Negative for seizures and syncope  Psychiatric/Behavioral: Negative for behavioral problems  Current Outpatient Medications on File Prior to Visit   Medication Sig   • ibuprofen (MOTRIN) 600 mg tablet Take 1 tablet (600 mg total) by mouth every 6 (six) hours as needed for moderate pain   • gabapentin (NEURONTIN) 100 mg capsule Take 1 capsule by mouth 3 (three) times a day (Patient not taking: Reported on 3/25/2022 )   • Lidocaine Viscous HCl (XYLOCAINE) 2 % mucosal solution Apply small amount of the viscous lidocaine to tooth in surrounding area every 2-3 hours for pain control as needed   (Patient not taking: Reported on 1/31/2023)   • methocarbamol (ROBAXIN) 750 mg tablet Take 750 mg by mouth 4 (four) times a day as needed (Patient not taking: Reported on 3/25/2022 )   • naproxen (NAPROSYN) 500 mg tablet Take 1 tablet (500 mg total) by mouth 2 (two) times a day with meals (Patient not taking: Reported on 10/8/2020)       Objective     /82   Pulse 64   Temp (!) 97 4 °F (36 3 °C)   Resp (!) 28   Wt 103 kg (227 lb 9 6 oz)   BMI 31 92 kg/m²     Physical Exam  Vitals and nursing note reviewed  Exam conducted with a chaperone present  Constitutional:       General: He is not in acute distress  Appearance: Normal appearance  He is normal weight  He is not ill-appearing, toxic-appearing or diaphoretic  HENT:      Head: Normocephalic and atraumatic  Right Ear: External ear normal       Left Ear: External ear normal       Nose: Nose normal       Mouth/Throat:      Pharynx: Oropharynx is clear  Eyes:      Extraocular Movements: Extraocular movements intact  Conjunctiva/sclera: Conjunctivae normal    Cardiovascular:      Rate and Rhythm: Normal rate  Pulmonary:      Effort: Pulmonary effort is normal    Abdominal:      General: There is no distension  Genitourinary:     Comments:  exam (with chaperone): Left side of scrotum appear more swollen than right  No erythema, ecchymosis, or rash noted  No increased warmth noted  No tenderness over right testes or epididymis  No spermatic cord tenderness   (+) Mild tenderness over left scrotum   (+) Marked tenderness over left epididymis  Penis normal on exam   Cremasteric reflex intact  No inguinal hernia or lymphadenopathy noted  Musculoskeletal:         General: Normal range of motion  Cervical back: Normal range of motion  Skin:     General: Skin is warm  Coloration: Skin is not jaundiced or pale  Neurological:      General: No focal deficit present  Mental Status: He is alert  Mental status is at baseline  Psychiatric:         Mood and Affect: Mood normal          Behavior: Behavior normal        Eze Rodriguez MD     Patient Instructions   1  Start taking Levofloxacin 500mg daily x10 days  2   Follow-up in 10 days to re-evaluate

## 2023-02-01 PROBLEM — N50.82 SCROTAL PAIN: Status: ACTIVE | Noted: 2023-02-01

## 2023-02-01 LAB
C TRACH DNA SPEC QL NAA+PROBE: NEGATIVE
N GONORRHOEA DNA SPEC QL NAA+PROBE: NEGATIVE

## 2023-02-01 NOTE — ASSESSMENT & PLAN NOTE
Patient reports acute onset left scrotal pain and swelling and pain after stepping off his truck at around 5:30AM yesterday (1/30/23) after many heavy liftings  Reports was bending down to  a large container and immediately felt sharp pain in his left groin a/w burning sensation which lasted for approx 10 seconds and improves since  Burning has completely resolved and pain/swelling has mildly improved with naproxen and ice compress    - Patient works as a  for [employer] requiring frequent heavy lifting  Otherwise denies H/O surgery, trauma, or STD    - Evaluated by Verito Edmond for workman's comp on 1/30/23 and was ordered US scrotum/testes which showed a 1 7 cm left epididymal cyst with possible left epididymitis  - Endorses yellow/mucousy sperm during intercourse last night but denies any blood in his ejaculate  Otherwise denies any inguinal mass, fever, chill, painful ejaculation, penile discharge,  rashes, dysuria, urinary frequency/hesitancy, suprapubic pain, or abdominal pain  - Currently sexually active with his recently  wife without consistent condom use, however had high risk sexual behavior in the past      exam (with chaperone): Left side of scrotum appear more swollen than right  No erythema, ecchymosis, or rash noted  No increased warmth noted  No tenderness over right testes or epididymis  No spermatic cord tenderness   (+) Mild tenderness over left scrotum   (+) Marked tenderness over left epididymis  Penis normal on exam   Cremasteric reflex intact  No inguinal hernia or lymphadenopathy noted  Assessment: Presentation and exam consistent with left epididymitis, suspected secondary to bacterial infection, which would likely be enteric organism (E  Coli) for age greater than 28    Could also be from irritation due to left epididymal cyst   However, due to high risk sexual behavior in the past, patient elected to be empirically treated for [facility] chlamydia at this time      Plan:  - UA and GC/chlamydia collected prior to ABX  - Ceftriaxone 500 mg IM x1 dose for weight <150 kg  - Levofloxacin 500 mg daily x10 days  - ED precaution reviewed  - Follow-up in 10 days to reevaluate

## 2023-02-02 LAB — BACTERIA UR CULT: NORMAL

## 2023-02-14 ENCOUNTER — OFFICE VISIT (OUTPATIENT)
Dept: FAMILY MEDICINE CLINIC | Facility: CLINIC | Age: 53
End: 2023-02-14

## 2023-02-14 VITALS
SYSTOLIC BLOOD PRESSURE: 127 MMHG | BODY MASS INDEX: 31.35 KG/M2 | TEMPERATURE: 98.7 F | WEIGHT: 223.5 LBS | DIASTOLIC BLOOD PRESSURE: 80 MMHG | HEART RATE: 73 BPM | RESPIRATION RATE: 16 BRPM

## 2023-02-14 DIAGNOSIS — N50.82 SCROTAL PAIN: Primary | ICD-10-CM

## 2023-02-16 NOTE — ASSESSMENT & PLAN NOTE
Symptomatically resolved s/p antibiotics course  - Recommend avoid tight/restrictive clothing in avoid trauma to scrotal area  - Discussed safe sexual practices with consistent condom use  - Follow-up as needed

## 2023-02-16 NOTE — PROGRESS NOTES
Name: Britney Morales      : 1970      MRN: [id number]  Encounter Provider: Anamika Patel MD  Encounter Date: 2023   Encounter department: [address] [place] [address]  Scrotal pain  Assessment & Plan:  Symptomatically resolved s/p antibiotics course  - Recommend avoid tight/restrictive clothing in avoid trauma to scrotal area  - Discussed safe sexual practices with consistent condom use  - Follow-up as needed           Follow-up: Return in about 1 month (around 3/14/2023) for Annual physical (schedule with PCP)  Subjective      Chief Complaint   Patient presents with   • Follow-up     HPI   63-year-old male presents for follow-up of left scrotal pain  Patient last seen by me on 23 for suspected epididymitis which was treated with short course of antibiotics  Patient states his symptom has essentially resolved after 2 to 3 days of antibiotics and has completed his antibiotics course  Patient's denies any current symptoms since  No other questions or question at this time  Review of Systems   Constitutional: Negative for chills and fever  HENT: Negative for trouble swallowing  Eyes: Negative for pain and redness  Respiratory: Negative for shortness of breath  Cardiovascular: Negative for chest pain  Gastrointestinal: Negative for abdominal pain  Endocrine: Negative for cold intolerance and heat intolerance  Genitourinary: Negative for dysuria and hematuria  Musculoskeletal: Negative for arthralgias and myalgias  Skin: Negative for wound  Neurological: Negative for seizures and syncope  Psychiatric/Behavioral: Negative for behavioral problems         Current Outpatient Medications on File Prior to Visit   Medication Sig   • ibuprofen (MOTRIN) 600 mg tablet Take 1 tablet (600 mg total) by mouth every 6 (six) hours as needed for moderate pain   • gabapentin (NEURONTIN) 100 mg capsule Take 1 capsule by mouth 3 (three) times a day (Patient not taking: Reported on 3/25/2022 )   • Lidocaine Viscous HCl (XYLOCAINE) 2 % mucosal solution Apply small amount of the viscous lidocaine to tooth in surrounding area every 2-3 hours for pain control as needed  (Patient not taking: Reported on 1/31/2023)   • methocarbamol (ROBAXIN) 750 mg tablet Take 750 mg by mouth 4 (four) times a day as needed (Patient not taking: Reported on 3/25/2022 )   • naproxen (NAPROSYN) 500 mg tablet Take 1 tablet (500 mg total) by mouth 2 (two) times a day with meals (Patient not taking: Reported on 10/8/2020)       Objective     /80   Pulse 73   Temp 98 7 °F (37 1 °C)   Resp 16   Wt 101 kg (223 lb 8 oz)   BMI 31 35 kg/m²     Physical Exam  Vitals and nursing note reviewed  Constitutional:       General: He is not in acute distress  Appearance: Normal appearance  He is normal weight  He is not ill-appearing, toxic-appearing or diaphoretic  HENT:      Head: Normocephalic and atraumatic  Right Ear: External ear normal       Left Ear: External ear normal       Nose: Nose normal       Mouth/Throat:      Pharynx: Oropharynx is clear  Eyes:      Extraocular Movements: Extraocular movements intact  Conjunctiva/sclera: Conjunctivae normal    Cardiovascular:      Rate and Rhythm: Normal rate  Pulmonary:      Effort: Pulmonary effort is normal    Abdominal:      General: There is no distension  Musculoskeletal:         General: Normal range of motion  Cervical back: Normal range of motion  Skin:     General: Skin is warm  Coloration: Skin is not jaundiced or pale  Neurological:      General: No focal deficit present  Mental Status: He is alert  Mental status is at baseline     Psychiatric:         Mood and Affect: Mood normal          Behavior: Behavior normal        Marina Phillip MD

## 2023-03-15 ENCOUNTER — TELEPHONE (OUTPATIENT)
Dept: FAMILY MEDICINE CLINIC | Facility: CLINIC | Age: 53
End: 2023-03-15

## 2023-11-05 ENCOUNTER — HOSPITAL ENCOUNTER (EMERGENCY)
Facility: HOSPITAL | Age: 53
Discharge: HOME | End: 2023-11-05
Attending: EMERGENCY MEDICINE | Admitting: EMERGENCY MEDICINE
Payer: COMMERCIAL

## 2023-11-05 VITALS
HEART RATE: 71 BPM | TEMPERATURE: 98.2 F | WEIGHT: 233.25 LBS | SYSTOLIC BLOOD PRESSURE: 142 MMHG | BODY MASS INDEX: 32.72 KG/M2 | RESPIRATION RATE: 18 BRPM | DIASTOLIC BLOOD PRESSURE: 74 MMHG | OXYGEN SATURATION: 95 %

## 2023-11-05 DIAGNOSIS — M54.2 CHRONIC NECK PAIN: ICD-10-CM

## 2023-11-05 DIAGNOSIS — M62.838 NECK MUSCLE SPASM: ICD-10-CM

## 2023-11-05 DIAGNOSIS — S16.1XXA STRAIN OF NECK MUSCLE, INITIAL ENCOUNTER: Primary | ICD-10-CM

## 2023-11-05 DIAGNOSIS — G89.29 CHRONIC NECK PAIN: ICD-10-CM

## 2023-11-05 PROCEDURE — 99283 EMERGENCY DEPT VISIT LOW MDM: CPT

## 2023-11-05 PROCEDURE — 99284 EMERGENCY DEPT VISIT MOD MDM: CPT | Performed by: EMERGENCY MEDICINE

## 2023-11-05 RX ORDER — LIDOCAINE 50 MG/G
1 PATCH TOPICAL DAILY
Qty: 7 PATCH | Refills: 0 | Status: SHIPPED | OUTPATIENT
Start: 2023-11-05 | End: 2023-11-05

## 2023-11-05 RX ORDER — CYCLOBENZAPRINE HCL 10 MG
10 TABLET ORAL ONCE
Status: COMPLETED | OUTPATIENT
Start: 2023-11-05 | End: 2023-11-05

## 2023-11-05 RX ORDER — CYCLOBENZAPRINE HCL 10 MG
10 TABLET ORAL 2 TIMES DAILY PRN
Qty: 10 TABLET | Refills: 0 | Status: SHIPPED | OUTPATIENT
Start: 2023-11-05 | End: 2023-11-05

## 2023-11-05 RX ORDER — CYCLOBENZAPRINE HCL 10 MG
10 TABLET ORAL 3 TIMES DAILY PRN
Qty: 20 TABLET | Refills: 0 | Status: SHIPPED | OUTPATIENT
Start: 2023-11-05 | End: 2023-11-10

## 2023-11-05 RX ORDER — LIDOCAINE 50 MG/G
1 PATCH TOPICAL DAILY
Qty: 12 PATCH | Refills: 0 | Status: SHIPPED | OUTPATIENT
Start: 2023-11-05 | End: 2023-11-10

## 2023-11-05 RX ORDER — LIDOCAINE 50 MG/G
1 PATCH TOPICAL ONCE
Status: DISCONTINUED | OUTPATIENT
Start: 2023-11-05 | End: 2023-11-06 | Stop reason: HOSPADM

## 2023-11-05 RX ADMIN — LIDOCAINE 1 PATCH: 700 PATCH TOPICAL at 23:11

## 2023-11-05 RX ADMIN — CYCLOBENZAPRINE HYDROCHLORIDE 10 MG: 10 TABLET, FILM COATED ORAL at 23:11

## 2023-11-05 NOTE — Clinical Note
Sharonda Danielle was seen and treated in our emergency department on 11/5/2023. No restrictions            Diagnosis:     Enzo Rodriguez  may return to work on return date. He may return on this date: 11/07/2023         If you have any questions or concerns, please don't hesitate to call.       Ashley Mei MD    ______________________________           _______________          _______________  Hospital Representative                              Date                                Time

## 2023-11-05 NOTE — Clinical Note
Bhargavi Bearden was seen and treated in our emergency department on 11/5/2023. No restrictions            Diagnosis:     Camilla Adams  may return to work on return date. He may return on this date: 11/07/2023         If you have any questions or concerns, please don't hesitate to call.       Kay Corona MD    ______________________________           _______________          _______________  Hospital Representative                              Date                                Time

## 2023-11-06 NOTE — ED PROVIDER NOTES
History  Chief Complaint   Patient presents with    Neck Pain     Pt reports neck pain that started x2 weeks ago that radiates down into back and into L shoulder. Reports 10/10 pain. Pts reports taking Ibuprofen at 1930 with no relief. Pt denies numbness. 45-year-old male with history of chronic bilateral shoulder pain and lumbar pain presents for left-sided neck pain that has been ongoing for several months with acute worsening last Friday while at work. He is a  and throughout the course of his day on Friday he noticed the pain was slowly becoming worse. After the workday was over and through the weekend he noted continued progression of the pain. He tried Motrin today with around 5:00 which did not do anything. He says the pain is on the left side of his neck and radiates down into the upper back and into the left shoulder. He denies headache, fever, chills, nausea, vomiting, numbness or tingling in the upper extremities. He does not feel he has any associated weakness. He says that he needs to sit upright with his head resting on something because the pain is intense. He also notes some decreased range of motion. Prior to Admission Medications   Prescriptions Last Dose Informant Patient Reported? Taking? Lidocaine Viscous HCl (XYLOCAINE) 2 % mucosal solution   No No   Sig: Apply small amount of the viscous lidocaine to tooth in surrounding area every 2-3 hours for pain control as needed.    Patient not taking: Reported on 1/31/2023   gabapentin (NEURONTIN) 100 mg capsule  Self Yes No   Sig: Take 1 capsule by mouth 3 (three) times a day   Patient not taking: Reported on 3/25/2022    ibuprofen (MOTRIN) 600 mg tablet   No No   Sig: Take 1 tablet (600 mg total) by mouth every 6 (six) hours as needed for moderate pain   methocarbamol (ROBAXIN) 750 mg tablet  Self Yes No   Sig: Take 750 mg by mouth 4 (four) times a day as needed   Patient not taking: Reported on 3/25/2022    naproxen (NAPROSYN) 500 mg tablet   No No   Sig: Take 1 tablet (500 mg total) by mouth 2 (two) times a day with meals   Patient not taking: Reported on 10/8/2020      Facility-Administered Medications: None       Past Medical History:   Diagnosis Date    Collapse of left lung     1987       Past Surgical History:   Procedure Laterality Date    BACK SURGERY         Family History   Problem Relation Age of Onset    Cancer Mother     Diabetes Mother     Cancer Father     Throat cancer Maternal Grandmother      I have reviewed and agree with the history as documented. E-Cigarette/Vaping    E-Cigarette Use Never User      E-Cigarette/Vaping Substances    Nicotine No     THC No     CBD No     Flavoring No     Other No     Unknown No      Social History     Tobacco Use    Smoking status: Every Day     Packs/day: 1.00     Years: 1.00     Total pack years: 1.00     Types: Cigarettes    Smokeless tobacco: Never   Vaping Use    Vaping Use: Never used   Substance Use Topics    Alcohol use: Not Currently     Comment: Pt "very little'    Drug use: No        Review of Systems   Constitutional:  Negative for chills and fatigue. Eyes:  Negative for photophobia and visual disturbance. Respiratory:  Negative for cough, chest tightness and shortness of breath. Cardiovascular:  Negative for chest pain, palpitations and leg swelling. Gastrointestinal:  Negative for abdominal pain, diarrhea, nausea and vomiting. Genitourinary:  Negative for flank pain. Musculoskeletal:  Positive for back pain, neck pain and neck stiffness. Negative for arthralgias and myalgias. Skin:  Negative for rash and wound. Neurological:  Negative for dizziness, tremors, seizures, syncope, facial asymmetry, speech difficulty, weakness, light-headedness, numbness and headaches. Psychiatric/Behavioral:  Negative for agitation and confusion.         Physical Exam  ED Triage Vitals [11/05/23 2232]   Temperature Pulse Respirations Blood Pressure SpO2   98.2 °F (36.8 °C) 71 18 142/74 95 %      Temp Source Heart Rate Source Patient Position - Orthostatic VS BP Location FiO2 (%)   Oral Monitor Sitting Right arm --      Pain Score       --             Orthostatic Vital Signs  Vitals:    11/05/23 2232   BP: 142/74   Pulse: 71   Patient Position - Orthostatic VS: Sitting       Physical Exam  Vitals reviewed. Constitutional:       General: He is in acute distress. Appearance: He is not ill-appearing. HENT:      Head: Normocephalic and atraumatic. Nose: Nose normal.   Eyes:      Extraocular Movements: Extraocular movements intact. Pupils: Pupils are equal, round, and reactive to light. Neck:      Comments: Limited range of motion of the neck due to pain. Patient able to extend neck and turn head to the right with mild discomfort. Neck flexion and turning to the left limited due to moderate to severe pain. No midline tenderness. Tenderness palpation of paraspinal cervical region extending to trapezius and deltoid. Cardiovascular:      Rate and Rhythm: Normal rate and regular rhythm. Pulmonary:      Effort: Pulmonary effort is normal. No respiratory distress. Musculoskeletal:         General: Tenderness present. No swelling or deformity. Cervical back: Rigidity and tenderness present. Comments: Range of motion of both shoulders preserved although painful. Lymphadenopathy:      Cervical: No cervical adenopathy. Skin:     General: Skin is warm and dry. Findings: No bruising, erythema or rash. Neurological:      General: No focal deficit present. Mental Status: He is alert and oriented to person, place, and time. Mental status is at baseline. Cranial Nerves: No cranial nerve deficit. Sensory: No sensory deficit. Motor: No weakness.       Comments: Neurologic examination of the bilateral upper extremities unremarkable   Psychiatric:         Mood and Affect: Mood normal.         Behavior: Behavior normal. Thought Content: Thought content normal.         Judgment: Judgment normal.         ED Medications  Medications   lidocaine (LIDODERM) 5 % patch 1 patch (1 patch Topical Medication Applied 11/5/23 2311)   cyclobenzaprine (FLEXERIL) tablet 10 mg (10 mg Oral Given 11/5/23 2311)       Diagnostic Studies  Results Reviewed       None                   No orders to display         Procedures  Procedures      ED Course                                       Medical Decision Making  70-year-old male with history of chronic bilateral shoulder pain and lumbar pain presents emergency department for acute on chronic left neck pain rating to left shoulder. Patient had suspected injury work which caused acute worsening. Examination today in the emergency department reveals no neurologic deficits. Suspicion for meningitis is low and can clinically be ruled out at this time. Differential diagnosis includes cervical disc degeneration/degenerative disc disease, nerve root compression cervical spine, muscle spasm secondary to overuse injury. Patient given Flexeril and lidocaine patch for the pain. Discussed the diagnosis with the patient who verbalized understanding. Referral to comprehensive spine placed for further treatment and therapy. Patient remained stable throughout his time in the emergency room was discharged home in stable condition with clear follow-up plan. He will report back to the emergency room for red flag symptoms which were reviewed today during his visit. Risk  Prescription drug management.           Disposition  Final diagnoses:   Chronic neck pain   Neck muscle spasm   Strain of neck muscle, initial encounter     Time reflects when diagnosis was documented in both MDM as applicable and the Disposition within this note       Time User Action Codes Description Comment    11/5/2023 11:23 PM Arleta Phoenix Add [M54.2,  G89.29] Chronic neck pain     11/5/2023 11:23 PM Arleta Phoenix Add [O88.097] Neck muscle spasm     11/5/2023 11:23 PM Yudith Reyes Add [S16. 1XXA] Strain of neck muscle, initial encounter     11/5/2023 11:23 PM Yudith Reyes Modify [M54.2,  G89.29] Chronic neck pain     11/5/2023 11:23 PM Yudith Reyes Modify [S16. 1XXA] Strain of neck muscle, initial encounter           ED Disposition       ED Disposition   Discharge    Condition   Stable    Date/Time   Sun Nov 5, 2023 11:23 PM    Comment   Kimberlyn Schneider discharge to home/self care.                    Follow-up Information       Follow up With Specialties Details Why Contact Info Additional Information    [facility] Physical Therapy Schedule an appointment as soon as possible for a visit   [phone]            Discharge Medication List as of 11/5/2023 11:26 PM        START taking these medications    Details   cyclobenzaprine (FLEXERIL) 10 mg tablet Take 1 tablet (10 mg total) by mouth 2 (two) times a day as needed for muscle spasms for up to 5 days, Starting Sun 11/5/2023, Until Fri 11/10/2023 at 2359, Normal      lidocaine (Lidoderm) 5 % Apply 1 patch topically over 12 hours daily for 7 days Remove & Discard patch within 12 hours or as directed by MD, Starting Sun 11/5/2023, Until Sun 11/12/2023, Normal           CONTINUE these medications which have NOT CHANGED    Details   gabapentin (NEURONTIN) 100 mg capsule Take 1 capsule by mouth 3 (three) times a day, Starting Wed 10/28/2015, Historical Med      ibuprofen (MOTRIN) 600 mg tablet Take 1 tablet (600 mg total) by mouth every 6 (six) hours as needed for moderate pain, Starting Fri 3/25/2022, Normal      Lidocaine Viscous HCl (XYLOCAINE) 2 % mucosal solution Apply small amount of the viscous lidocaine to tooth in surrounding area every 2-3 hours for pain control as needed., Normal      methocarbamol (ROBAXIN) 750 mg tablet Take 750 mg by mouth 4 (four) times a day as needed, Starting Thu 7/23/2020, Historical Med      naproxen (NAPROSYN) 500 mg tablet Take 1 tablet (500 mg total) by mouth 2 (two) times a day with meals, Starting Wed 9/9/2020, Normal           No discharge procedures on file. PDMP Review         Value Time User    PDMP Reviewed  Yes 11/5/2023 11:06 PM Marilou Duke DO             ED Provider  Attending physically available and evaluated Samir Pantoja. I managed the patient along with the ED Attending.     Electronically Signed by           Michele Flores MD  11/06/23 0127

## 2023-11-06 NOTE — ED ATTENDING ATTESTATION
11/5/2023  IJessica, , saw and evaluated the patient. I have discussed the patient with the resident/non-physician practitioner and agree with the resident's/non-physician practitioner's findings, Plan of Care, and MDM as documented in the resident's/non-physician practitioner's note, except where noted. All available labs and Radiology studies were reviewed. I was present for key portions of any procedure(s) performed by the resident/non-physician practitioner and I was immediately available to provide assistance. At this point I agree with the current assessment done in the Emergency Department. I have conducted an independent evaluation of this patient a history and physical is as follows: 52y M w/ acute on chronic neck pain - worsening over the last 1-2 weeks. Primarily left sided w/ radiation down the arm. Taking ibuprofen w/o improvement. Denies numbness or weakness. Pain worse w/ movement. Exam WNWD nad, mmm, neck - reduced ROM 2/2 pain, tender left paraspinal mm tenderness/trap, no midline, resp non-labored, cv regular rate, abd nd, ext no cce, skin dry, neuro non-focal, normal mood. A/P Acute on chronic neck pain, left sided radiculopathy. Symptomatic treatment, referral to comprehensive spine program      ED Course         Critical Care Time  Procedures    1. Strain of neck muscle, initial encounter  cyclobenzaprine (FLEXERIL) 10 mg tablet    lidocaine (Lidoderm) 5 %    DISCONTINUED: cyclobenzaprine (FLEXERIL) 10 mg tablet    DISCONTINUED: lidocaine (Lidoderm) 5 %      2. Chronic neck pain        3. Neck muscle spasm          Time reflects when diagnosis was documented in both MDM as applicable and the Disposition within this note       Time User Action Codes Description Comment    11/5/2023 11:23 PM Susan Bassem Add [M54.2,  G89.29] Chronic neck pain     11/5/2023 11:23 PM Susan Bassem Add [K51.825] Neck muscle spasm     11/5/2023 11:23 PM Susan Bassem Add [S16. 1XXA] Strain of neck muscle, initial encounter     11/5/2023 11:23 PM Bertha Fluke Modify [M54.2,  G89.29] Chronic neck pain     11/5/2023 11:23 PM Bertha Fluke Modify [S16. 1XXA] Strain of neck muscle, initial encounter           ED Disposition       ED Disposition   Discharge    Condition   Stable    Date/Time   Sun Nov 5, 2023 11:23 PM    Comment   Nam Lawton discharge to home/self care.                    Follow-up Information       Follow up With Specialties Details Why Contact Info Additional Information    [facility] Physical Therapy Schedule an appointment as soon as possible for a visit   [phone]

## 2023-11-07 ENCOUNTER — TELEPHONE (OUTPATIENT)
Dept: PHYSICAL THERAPY | Facility: OTHER | Age: 53
End: 2023-11-07

## 2023-11-07 NOTE — TELEPHONE ENCOUNTER
Patient called into comp spine to "make an appt".  I explained our nurse would triage him and send him for PT eval.     Patient is already established with Dr Corbin Knox in [facility], and will follow up with them     No ref to close

## 2023-11-10 ENCOUNTER — OFFICE VISIT (OUTPATIENT)
Dept: FAMILY MEDICINE CLINIC | Facility: CLINIC | Age: 53
End: 2023-11-10

## 2023-11-10 VITALS
BODY MASS INDEX: 32.9 KG/M2 | TEMPERATURE: 98.7 F | DIASTOLIC BLOOD PRESSURE: 86 MMHG | HEART RATE: 78 BPM | SYSTOLIC BLOOD PRESSURE: 135 MMHG | WEIGHT: 235 LBS | RESPIRATION RATE: 18 BRPM | OXYGEN SATURATION: 100 % | HEIGHT: 71 IN

## 2023-11-10 DIAGNOSIS — M54.2 CERVICAL PAIN: Primary | ICD-10-CM

## 2023-11-10 DIAGNOSIS — R21 RASH: ICD-10-CM

## 2023-11-10 PROCEDURE — 99213 OFFICE O/P EST LOW 20 MIN: CPT | Performed by: FAMILY MEDICINE

## 2023-11-10 RX ORDER — SENNOSIDES 8.6 MG
650 CAPSULE ORAL EVERY 8 HOURS PRN
Qty: 30 TABLET | Refills: 0 | Status: SHIPPED | OUTPATIENT
Start: 2023-11-10

## 2023-11-10 RX ORDER — METHOCARBAMOL 500 MG/1
500 TABLET, FILM COATED ORAL 3 TIMES DAILY PRN
Qty: 15 TABLET | Refills: 0 | Status: SHIPPED | OUTPATIENT
Start: 2023-11-10

## 2023-11-10 RX ORDER — LIDOCAINE 40 MG/G
CREAM TOPICAL DAILY PRN
Qty: 5 G | Refills: 0 | Status: SHIPPED | OUTPATIENT
Start: 2023-11-10

## 2023-11-10 NOTE — ASSESSMENT & PLAN NOTE
With 3-4 centimeter circular patch of scaly flaky skin on left foot. Pulses intact and has full range of motion. Patient reports he has had this for for several weeks and have tried several over-the-counter antifungal which was suggested by prior physician. Patient states that the rash improves and then returns. Recommended patient try over-the-counter steroid cream, since multiple antifungals have failed. Rash concerning for antifungal versus autoimmune etiology. Went over proper skin/wound management. We will have patient see podiatry for further evaluation. Referral sent.

## 2023-11-10 NOTE — ASSESSMENT & PLAN NOTE
Patient with 5 days of cervical pain. Patient seen in ED on 11/after he woke up with neck pain. Per patient given lidocaine patches and told to follow-up with PCP. Since ED visit patient reports continued pain with any range of movement. On exam patient has 5 out of 5 strength upper and lower extremities, with sensation intact. Cranial nerves intact. Paraspinal cervical tenderness and muscular tenderness noted. Negative kernig and brudzinski on exam.  Patient denies any photophobia, phonophobia, vomiting, recent illnesses or fevers. Pain likely secondary to muscle strain. Will give patient Tylenol, Robaxin and lidocaine cream.  Reviewed red flag symptoms for meningitis, all which patient denied on exam today. But is in agreement to go to the ER if he develops any of the symptoms. And also encouraged patient to go to the ER if he develops any upper extremity weakness. We will also have patient see comprehensive spine.

## 2023-11-10 NOTE — PROGRESS NOTES
Name: Cecilio Shah      : 1970      MRN: [id number]  Encounter Provider: Alvaro Oliver MD  Encounter Date: 11/10/2023   Encounter department: [address]     1. Cervical pain  Assessment & Plan:  Patient with 5 days of cervical pain. Patient seen in ED on 11/after he woke up with neck pain. Per patient given lidocaine patches and told to follow-up with PCP. Since ED visit patient reports continued pain with any range of movement. On exam patient has 5 out of 5 strength upper and lower extremities, with sensation intact. Cranial nerves intact. Paraspinal cervical tenderness and muscular tenderness noted. Negative kernig and brudzinski on exam.  Patient denies any photophobia, phonophobia, vomiting, recent illnesses or fevers. Pain likely secondary to muscle strain. Will give patient Tylenol, Robaxin and lidocaine cream.  Reviewed red flag symptoms for meningitis, all which patient denied on exam today. But is in agreement to go to the ER if he develops any of the symptoms. And also encouraged patient to go to the ER if he develops any upper extremity weakness. We will also have patient see comprehensive spine. Orders:  -     Ambulatory Referral to [facility]; Future  -     XR spine cervical complete 4 or 5 vw non injury; Future; Expected date: 11/10/2023  -     acetaminophen (TYLENOL) 650 mg CR tablet; Take 1 tablet (650 mg total) by mouth every 8 (eight) hours as needed for mild pain  -     methocarbamol (ROBAXIN) 500 mg tablet; Take 1 tablet (500 mg total) by mouth 3 (three) times a day as needed for muscle spasms  -     lidocaine (LMX) 4 % cream; Apply topically daily as needed for mild pain    2. Rash  Assessment & Plan: With 3-4 centimeter circular patch of scaly flaky skin on left foot. Pulses intact and has full range of motion.   Patient reports he has had this for for several weeks and have tried several over-the-counter antifungal which was suggested by prior physician. Patient states that the rash improves and then returns. Recommended patient try over-the-counter steroid cream, since multiple antifungals have failed. Rash concerning for antifungal versus autoimmune etiology. Went over proper skin/wound management. We will have patient see podiatry for further evaluation. Referral sent. Orders:  -     Ambulatory Referral to Podiatry; Future        Depression Screening and Follow-up Plan: Patient was screened for depression during today's encounter. They screened negative with a PHQ-2 score of 0. Subjective      Patient here for neck pain that started 5 days ago and has not improved. Patient works as a  and has difficulty completing his job currently because of the pain. Patient also reporting foot rash that he has tried several over-the-counter medications. Review of Systems   Constitutional:  Negative for chills and fever. HENT:  Negative for ear pain and sore throat. Eyes:  Negative for photophobia, pain and visual disturbance. Respiratory:  Negative for cough and shortness of breath. Cardiovascular:  Negative for chest pain and palpitations. Gastrointestinal:  Negative for abdominal pain and vomiting. Genitourinary:  Negative for dysuria and hematuria. Musculoskeletal:  Positive for neck pain and neck stiffness. Negative for arthralgias and back pain. Skin:  Positive for rash. Negative for color change. Neurological:  Negative for seizures, syncope, facial asymmetry and headaches. All other systems reviewed and are negative.     Current Outpatient Medications on File Prior to Visit   Medication Sig   • ibuprofen (MOTRIN) 600 mg tablet Take 1 tablet (600 mg total) by mouth every 6 (six) hours as needed for moderate pain   • [DISCONTINUED] cyclobenzaprine (FLEXERIL) 10 mg tablet Take 1 tablet (10 mg total) by mouth 3 (three) times a day as needed for muscle spasms   • [DISCONTINUED] lidocaine (Lidoderm) 5 % Apply 1 patch topically over 12 hours daily Remove & Discard patch within 12 hours or as directed by MD   • gabapentin (NEURONTIN) 100 mg capsule Take 1 capsule by mouth 3 (three) times a day (Patient not taking: Reported on 3/25/2022)   • naproxen (NAPROSYN) 500 mg tablet Take 1 tablet (500 mg total) by mouth 2 (two) times a day with meals (Patient not taking: Reported on 10/8/2020)   • [DISCONTINUED] Lidocaine Viscous HCl (XYLOCAINE) 2 % mucosal solution Apply small amount of the viscous lidocaine to tooth in surrounding area every 2-3 hours for pain control as needed. (Patient not taking: Reported on 1/31/2023)   • [DISCONTINUED] methocarbamol (ROBAXIN) 750 mg tablet Take 750 mg by mouth 4 (four) times a day as needed (Patient not taking: Reported on 3/25/2022)       Objective     /86 (BP Location: Left arm, Patient Position: Sitting, Cuff Size: Large)   Pulse 78   Temp 98.7 °F (37.1 °C) (Temporal)   Resp 18   Ht 5' 11" (1.803 m)   Wt 107 kg (235 lb)   SpO2 100%   BMI 32.78 kg/m²     Physical Exam  Constitutional:       General: He is awake. Appearance: Normal appearance. HENT:      Head: Normocephalic. Jaw: There is normal jaw occlusion. Eyes:      General: Lids are normal.   Cardiovascular:      Rate and Rhythm: Normal rate and regular rhythm. Heart sounds: Normal heart sounds, S1 normal and S2 normal.   Pulmonary:      Effort: Pulmonary effort is normal. No tachypnea or bradypnea. Breath sounds: Normal breath sounds and air entry. Abdominal:      General: Bowel sounds are normal.      Palpations: Abdomen is soft. Tenderness: There is no abdominal tenderness. Musculoskeletal:         General: No swelling or tenderness. Right lower leg: No edema. Left lower leg: No edema.       Comments: Paraspinal cervical tenderness no point tenderness noted  5 out of 5 strength upper and lower extremity  Cranial nerves II to XII intact  Negative kernig and brudzinski on exam   Skin:     General: Skin is warm. Neurological:      Mental Status: He is alert and oriented to person, place, and time. Psychiatric:         Attention and Perception: Attention normal.         Behavior: Behavior is cooperative.      Rogelio Allen MD

## 2023-11-13 ENCOUNTER — TELEPHONE (OUTPATIENT)
Dept: PHYSICAL THERAPY | Facility: OTHER | Age: 53
End: 2023-11-13

## 2023-11-13 NOTE — TELEPHONE ENCOUNTER
Call placed to the patient per [facility] referral.    V/M left for patient to call back if he has any questions or changed his mind about PT eval. Phone number provided. NOTE: Patient spoke with [address] [place] [address] staff on 11/07. Patient declined services. He is already established with [facility] and he stated he was going to follow up with them. Please read previous telephone encounter. Will close referral per protocol.

## 2023-11-16 ENCOUNTER — TELEPHONE (OUTPATIENT)
Dept: FAMILY MEDICINE CLINIC | Facility: CLINIC | Age: 53
End: 2023-11-16

## 2023-11-16 NOTE — TELEPHONE ENCOUNTER
Signature Required    Dr Dakota Chaudhry  Attending Physician Statement   Claim ID [id number]    Red folder    Fax [phone]

## 2023-11-17 ENCOUNTER — OFFICE VISIT (OUTPATIENT)
Dept: FAMILY MEDICINE CLINIC | Facility: CLINIC | Age: 53
End: 2023-11-17

## 2023-11-17 VITALS
DIASTOLIC BLOOD PRESSURE: 84 MMHG | BODY MASS INDEX: 31.69 KG/M2 | TEMPERATURE: 97.8 F | RESPIRATION RATE: 18 BRPM | HEART RATE: 106 BPM | WEIGHT: 227.2 LBS | SYSTOLIC BLOOD PRESSURE: 120 MMHG

## 2023-11-17 DIAGNOSIS — M54.2 CERVICAL PAIN: Primary | ICD-10-CM

## 2023-11-17 PROCEDURE — 99213 OFFICE O/P EST LOW 20 MIN: CPT | Performed by: FAMILY MEDICINE
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
## 2023-11-30 ENCOUNTER — HOSPITAL ENCOUNTER (OUTPATIENT)
Dept: RADIOLOGY | Facility: HOSPITAL | Age: 53
Discharge: HOME | End: 2023-11-30
Payer: COMMERCIAL

## 2023-11-30 DIAGNOSIS — M54.2 CERVICAL PAIN: ICD-10-CM

## 2023-11-30 PROCEDURE — 72050 X-RAY EXAM NECK SPINE 4/5VWS: CPT

## 2023-12-01 ENCOUNTER — OFFICE VISIT (OUTPATIENT)
Dept: FAMILY MEDICINE CLINIC | Facility: CLINIC | Age: 53
End: 2023-12-01

## 2023-12-01 VITALS
WEIGHT: 230 LBS | DIASTOLIC BLOOD PRESSURE: 81 MMHG | TEMPERATURE: 98.3 F | SYSTOLIC BLOOD PRESSURE: 126 MMHG | HEIGHT: 71 IN | BODY MASS INDEX: 32.2 KG/M2 | OXYGEN SATURATION: 99 % | HEART RATE: 74 BPM

## 2023-12-01 DIAGNOSIS — M25.512 ACUTE PAIN OF LEFT SHOULDER: ICD-10-CM

## 2023-12-01 DIAGNOSIS — Z12.5 PROSTATE CANCER SCREENING: ICD-10-CM

## 2023-12-01 DIAGNOSIS — Z12.11 COLON CANCER SCREENING: ICD-10-CM

## 2023-12-01 DIAGNOSIS — Z13.6 SCREENING FOR CARDIOVASCULAR CONDITION: ICD-10-CM

## 2023-12-01 DIAGNOSIS — Z00.00 ANNUAL PHYSICAL EXAM: Primary | ICD-10-CM

## 2023-12-01 PROCEDURE — 99396 PREV VISIT EST AGE 40-64: CPT | Performed by: FAMILY MEDICINE

## 2023-12-01 RX ORDER — NAPROXEN 500 MG/1
500 TABLET ORAL 2 TIMES DAILY WITH MEALS
Qty: 20 TABLET | Refills: 0 | Status: SHIPPED | OUTPATIENT
Start: 2023-12-01

## 2023-12-01 NOTE — PATIENT INSTRUCTIONS
Wellness Visit for Adults   AMBULATORY CARE:   A wellness visit  is when you see your healthcare provider to get screened for health problems. Your healthcare provider will also give you advice on how to stay healthy. Write down your questions so you remember to ask them. Ask your healthcare provider how often you should have a wellness visit. What happens at a wellness visit:  Your healthcare provider will ask about your health, and your family history of health problems. This includes high blood pressure, heart disease, and cancer. He or she will ask if you have symptoms that concern you, if you smoke, and about your mood. You may also be asked about your intake of medicines, supplements, food, and alcohol. Any of the following may be done: Your weight  will be checked. Your height may also be checked so your body mass index (BMI) can be calculated. Your BMI shows if you are at a healthy weight. Your blood pressure  and heart rate will be checked. Your temperature may also be checked. Blood and urine tests  may be done. Blood tests may be done to check your cholesterol levels. Abnormal cholesterol levels increase your risk for heart disease and stroke. You may also need a blood or urine test to check for diabetes if you are at increased risk. Urine tests may be done to look for signs of an infection or kidney disease. A physical exam  includes checking your heartbeat and lungs with a stethoscope. Your healthcare provider may also check your skin to look for sun damage. Screening tests  may be recommended. A screening test is done to check for diseases that may not cause symptoms. The screening tests you may need depend on your age, gender, family history, and lifestyle habits. For example, colorectal screening may be recommended if you are 48years old or older. Screening tests you need if you are a woman:   A Pap smear  is used to screen for cervical cancer.  Pap smears are usually done every 3 to 5 years depending on your age. You may need them more often if you have had abnormal Pap smear test results in the past. Ask your healthcare provider how often you should have a Pap smear. A mammogram  is an x-ray of your breasts to screen for breast cancer. Experts recommend mammograms every 2 years starting at age 48 years. You may need a mammogram at age 52 years or younger if you have an increased risk for breast cancer. Talk to your healthcare provider about when you should start having mammograms and how often you need them. Vaccines you may need:   Get an influenza vaccine  every year. The influenza vaccine protects you from the flu. Several types of viruses cause the flu. The viruses change over time, so new vaccines are made each year. Get a tetanus-diphtheria (Td) booster vaccine  every 10 years. This vaccine protects you against tetanus and diphtheria. Tetanus is a severe infection that may cause painful muscle spasms and lockjaw. Diphtheria is a severe bacterial infection that causes a thick covering in the back of your mouth and throat. Get a human papillomavirus (HPV) vaccine  if you are female and aged 23 to 32 or male 23 to 24 and never received it. This vaccine protects you from HPV infection. HPV is the most common infection spread by sexual contact. HPV may also cause vaginal, penile, and anal cancers. Get a pneumococcal vaccine  if you are aged 72 years or older. The pneumococcal vaccine is an injection given to protect you from pneumococcal disease. Pneumococcal disease is an infection caused by pneumococcal bacteria. The infection may cause pneumonia, meningitis, or an ear infection. Get a shingles vaccine  if you are 60 or older, even if you have had shingles before. The shingles vaccine is an injection to protect you from the varicella-zoster virus. This is the same virus that causes chickenpox.  Shingles is a painful rash that develops in people who had chickenpox or have been exposed to the virus. How to eat healthy:  My Plate is a model for planning healthy meals. It shows the types and amounts of foods that should go on your plate. Fruits and vegetables make up about half of your plate, and grains and protein make up the other half. A serving of dairy is included on the side of your plate. The amount of calories and serving sizes you need depends on your age, gender, weight, and height. Examples of healthy foods are listed below:  Eat a variety of vegetables  such as dark green, red, and orange vegetables. You can also include canned vegetables low in sodium (salt) and frozen vegetables without added butter or sauces. Eat a variety of fresh fruits , canned fruit in 100% juice, frozen fruit, and dried fruit. Include whole grains. At least half of the grains you eat should be whole grains. Examples include whole-wheat bread, wheat pasta, brown rice, and whole-grain cereals such as oatmeal.    Eat a variety of protein foods such as seafood (fish and shellfish), lean meat, and poultry without skin (turkey and chicken). Examples of lean meats include pork leg, shoulder, or tenderloin, and beef round, sirloin, tenderloin, and extra lean ground beef. Other protein foods include eggs and egg substitutes, beans, peas, soy products, nuts, and seeds. Choose low-fat dairy products such as skim or 1% milk or low-fat yogurt, cheese, and cottage cheese. Limit unhealthy fats  such as butter, hard margarine, and shortening. Exercise:  Exercise at least 30 minutes per day on most days of the week. Some examples of exercise include walking, biking, dancing, and swimming. You can also fit in more physical activity by taking the stairs instead of the elevator or parking farther away from stores. Include muscle strengthening activities 2 days each week. Regular exercise provides many health benefits.  It helps you manage your weight, and decreases your risk for type 2 diabetes, heart disease, stroke, and high blood pressure. Exercise can also help improve your mood. Ask your healthcare provider about the best exercise plan for you. General health and safety guidelines:   Do not smoke. Nicotine and other chemicals in cigarettes and cigars can cause lung damage. Ask your healthcare provider for information if you currently smoke and need help to quit. E-cigarettes or smokeless tobacco still contain nicotine. Talk to your healthcare provider before you use these products. Limit alcohol. A drink of alcohol is 12 ounces of beer, 5 ounces of wine, or 1½ ounces of liquor. Lose weight, if needed. Being overweight increases your risk of certain health conditions. These include heart disease, high blood pressure, type 2 diabetes, and certain types of cancer. Protect your skin. Do not sunbathe or use tanning beds. Use sunscreen with a SPF 15 or higher. Apply sunscreen at least 15 minutes before you go outside. Reapply sunscreen every 2 hours. Wear protective clothing, hats, and sunglasses when you are outside. Drive safely. Always wear your seatbelt. Make sure everyone in your car wears a seatbelt. A seatbelt can save your life if you are in an accident. Do not use your cell phone when you are driving. This could distract you and cause an accident. Pull over if you need to make a call or send a text message. Practice safe sex. Use latex condoms if are sexually active and have more than one partner. Your healthcare provider may recommend screening tests for sexually transmitted infections (STIs). Wear helmets, lifejackets, and protective gear. Always wear a helmet when you ride a bike or motorcycle, go skiing, or play sports that could cause a head injury. Wear protective equipment when you play sports. Wear a lifejacket when you are on a boat or doing water sports.     © Copyright [employer] 2023 Information is for End User's use only and may not be sold, redistributed or otherwise used for commercial purposes. The above information is an  only. It is not intended as medical advice for individual conditions or treatments. Talk to your doctor, nurse or pharmacist before following any medical regimen to see if it is safe and effective for you.

## 2023-12-01 NOTE — PROGRESS NOTES
[address] [facility]    NAME: Jillian Simons  AGE: 48 y.o. SEX: male  : 1970     DATE: 2023     Assessment and Plan:     Problem List Items Addressed This Visit       Acute pain of left shoulder    Relevant Medications    naproxen (NAPROSYN) 500 mg tablet    Annual physical exam - Primary    Screening for cardiovascular condition    Relevant Orders    Lipid Panel with Direct LDL reflex    CBC and Platelet    Hemoglobin G7I    Basic metabolic panel    Prostate cancer screening    Relevant Orders    PSA Total (Reflex To Free)    Colon cancer screening    Relevant Orders    Ambulatory Referral to Gastroenterology         Immunizations and preventive care screenings were discussed with patient today. Appropriate education was printed on patient's after visit summary. Discussed risks and benefits of prostate cancer screening. We discussed the controversial history of PSA screening for prostate cancer in the [facility] as well as the risk of over detection and over treatment of prostate cancer by way of PSA screening. The patient understands that PSA blood testing is an imperfect way to screen for prostate cancer and that elevated PSA levels in the blood may also be caused by infection, inflammation, prostatic trauma or manipulation, urological procedures, or by benign prostatic enlargement. The role of the digital rectal examination in prostate cancer screening was also discussed and I discussed with him that there is large interobserver variability in the findings of digital rectal examination. Counseling:  Alcohol/drug use: discussed moderation in alcohol intake, the recommendations for healthy alcohol use, and avoidance of illicit drug use. Injury prevention: discussed safety/seat belts, safety helmets, smoke detectors, carbon dioxide detectors, and smoking near bedding or upholstery.   Sexual health: discussed sexually transmitted diseases, partner selection, use of condoms, avoidance of unintended pregnancy, and contraceptive alternatives. Exercise: the importance of regular exercise/physical activity was discussed. Recommend exercise 3-5 times per week for at least 30 minutes. Depression Screening and Follow-up Plan: Patient was screened for depression during today's encounter. They screened negative with a PHQ-2 score of 0. No follow-ups on file. Chief Complaint:     Chief Complaint   Patient presents with    Annual Exam      History of Present Illness:     Adult Annual Physical   Patient here for a comprehensive physical exam. The patient reports no problems. Diet and Physical Activity  Diet/Nutrition: well balanced diet. Exercise: walking and works at a physical demanding job . Depression Screening  PHQ-2/9 Depression Screening    Little interest or pleasure in doing things: 0 - not at all  Feeling down, depressed, or hopeless: 0 - not at all  PHQ-2 Score: 0  PHQ-2 Interpretation: Negative depression screen       General Health  Sleep: gets 7-8 hours of sleep on average. Hearing: normal - bilateral.  Vision: vision problems: need glasses and most recent eye exam >1 year ago. Dental: no dental visits for >1 year.  Health  Symptoms include: none    Advanced Care Planning  Do you have an advanced directive? no  Do you have a durable medical power of ? no     Review of Systems:     Review of Systems   Constitutional:  Negative for chills and fever. HENT:  Negative for ear pain and sore throat. Eyes:  Negative for pain and visual disturbance. Respiratory:  Negative for cough and shortness of breath. Cardiovascular:  Negative for chest pain and palpitations. Gastrointestinal:  Negative for abdominal pain and vomiting. Genitourinary:  Negative for dysuria and hematuria. Musculoskeletal:  Negative for arthralgias and back pain.    Skin:  Negative for color change and rash. Neurological:  Negative for seizures and syncope. All other systems reviewed and are negative. Past Medical History:     Past Medical History:   Diagnosis Date    Collapse of left lung     28      Past Surgical History:     Past Surgical History:   Procedure Laterality Date    BACK SURGERY        Family History:     Family History   Problem Relation Age of Onset    Cancer Mother     Diabetes Mother     Cancer Father     Throat cancer Maternal Grandmother       Social History:     Social History     Socioeconomic History    Marital status: /Civil Union     Spouse name: None    Number of children: None    Years of education: None    Highest education level: None   Occupational History    None   Tobacco Use    Smoking status: Every Day     Packs/day: 1.00     Years: 1.00     Total pack years: 1.00     Types: Cigarettes    Smokeless tobacco: Never   Vaping Use    Vaping Use: Never used   Substance and Sexual Activity    Alcohol use: Not Currently     Comment: Pt "very little'    Drug use: No    Sexual activity: Yes     Partners: Female   Other Topics Concern    None   Social History Narrative    None     Social Determinants of Health     Financial Resource Strain: Low Risk  (11/10/2023)    Overall Financial Resource Strain (CARDIA)     Difficulty of Paying Living Expenses: Not hard at all   Food Insecurity: No Food Insecurity (11/10/2023)    Hunger Vital Sign     Worried About Running Out of Food in the Last Year: Never true     Ran Out of Food in the Last Year: Never true   Transportation Needs: No Transportation Needs (11/10/2023)    PRAPARE - Transportation     Lack of Transportation (Medical): No     Lack of Transportation (Non-Medical):  No   Physical Activity: Insufficiently Active (11/10/2023)    Exercise Vital Sign     Days of Exercise per Week: 2 days     Minutes of Exercise per Session: 50 min   Stress: No Stress Concern Present (11/10/2023)    [employer] of Occupational Health - Occupational Stress Questionnaire     Feeling of Stress : Only a little   Social Connections:  Moderately Isolated (12/1/2023)    Social Connection and Isolation Panel [NHANES]     Frequency of Communication with Friends and Family: More than three times a week     Frequency of Social Gatherings with Friends and Family: More than three times a week     Attends Zoroastrianism Services: Never     Active Member of Clubs or Organizations: No     Attends Club or Organization Meetings: Never     Marital Status:    Intimate Partner Violence: Not At Risk (11/10/2023)    Humiliation, Afraid, Rape, and Kick questionnaire     Fear of Current or Ex-Partner: No     Emotionally Abused: No     Physically Abused: No     Sexually Abused: No   Housing Stability: Low Risk  (11/10/2023)    Housing Stability Vital Sign     Unable to Pay for Housing in the Last Year: No     Number of [address] in the Last Year: 1     Unstable Housing in the Last Year: No      Current Medications:     Current Outpatient Medications   Medication Sig Dispense Refill    methocarbamol (ROBAXIN) 500 mg tablet Take 1 tablet (500 mg total) by mouth 3 (three) times a day as needed for muscle spasms 15 tablet 0    naproxen (NAPROSYN) 500 mg tablet Take 1 tablet (500 mg total) by mouth 2 (two) times a day with meals 20 tablet 0    acetaminophen (TYLENOL) 650 mg CR tablet Take 1 tablet (650 mg total) by mouth every 8 (eight) hours as needed for mild pain (Patient not taking: Reported on 12/1/2023) 30 tablet 0    gabapentin (NEURONTIN) 100 mg capsule Take 1 capsule by mouth 3 (three) times a day (Patient not taking: Reported on 3/25/2022)      ibuprofen (MOTRIN) 600 mg tablet Take 1 tablet (600 mg total) by mouth every 6 (six) hours as needed for moderate pain (Patient not taking: Reported on 12/1/2023) 30 tablet 0    lidocaine (LMX) 4 % cream Apply topically daily as needed for mild pain (Patient not taking: Reported on 11/17/2023) 5 g 0 No current facility-administered medications for this visit. Allergies: Allergies   Allergen Reactions    Bee Venom Anaphylaxis      Physical Exam:     /81 (BP Location: Left arm, Patient Position: Sitting, Cuff Size: Large)   Pulse 74   Temp 98.3 °F (36.8 °C) (Temporal)   Ht 5' 11" (1.803 m)   Wt 104 kg (230 lb)   SpO2 99%   BMI 32.08 kg/m²     Physical Exam  Vitals and nursing note reviewed. Constitutional:       General: He is not in acute distress. Appearance: He is well-developed. HENT:      Head: Normocephalic and atraumatic. Eyes:      Conjunctiva/sclera: Conjunctivae normal.   Cardiovascular:      Rate and Rhythm: Normal rate and regular rhythm. Heart sounds: No murmur heard. Pulmonary:      Effort: Pulmonary effort is normal. No respiratory distress. Breath sounds: Normal breath sounds. Abdominal:      Palpations: Abdomen is soft. Tenderness: There is no abdominal tenderness. Musculoskeletal:         General: No swelling. Cervical back: Neck supple. Skin:     General: Skin is warm and dry. Capillary Refill: Capillary refill takes less than 2 seconds. Neurological:      Mental Status: He is alert.    Psychiatric:         Mood and Affect: Mood normal.          MD Saran Wong

## 2023-12-01 NOTE — LETTER
December 1, 2023     Patient: Emilio Peraza  YOB: 1970  Date of Visit: 12/1/2023      To Whom it May Concern:    Emilio Peraza is under my professional care. Rowan Wilcox was seen in my office on 12/1/2023. Rowan Wilcox may return to work with limitations   . Please allow Jimmy to take a break from lifting any more than 10 pounds for the next 2 weeks, to allow for rest of recent injury. If you have any questions or concerns, please don't hesitate to call.          Sincerely,          [address], [place]        CC: No Recipients

## 2023-12-04 ENCOUNTER — TELEPHONE (OUTPATIENT)
Age: 53
End: 2023-12-04

## 2023-12-04 ENCOUNTER — TELEPHONE (OUTPATIENT)
Dept: FAMILY MEDICINE CLINIC | Facility: CLINIC | Age: 53
End: 2023-12-04

## 2023-12-04 NOTE — TELEPHONE ENCOUNTER
Pt needs updated letter dated 12/1, stating patient can drive, but not lift more than 10 lbs. 2.     Folder (to be completed by): Dr. Dalton Layne    Name of Form: Workplace Accommodation Request [place] [employer]    Color Folder: Red    Form will be picked up by patient      Patient was seen in office on 12/1/23,  and is urgently needing this paperwork completed as soon as possible.

## 2023-12-04 NOTE — TELEPHONE ENCOUNTER
Patients GI provider:  Dr. Meghana Shelley     Number to return call: [phone]    Reason for VISIT:   Heartburn     Scheduled procedure/appointment date if applicable: Appt 1/38/85

## 2023-12-07 ENCOUNTER — TELEPHONE (OUTPATIENT)
Dept: FAMILY MEDICINE CLINIC | Facility: CLINIC | Age: 53
End: 2023-12-07

## 2023-12-07 NOTE — TELEPHONE ENCOUNTER
Medical records request from [facility],  for patient's employer. Requesting all records from 1/1/2011 to present. Request faxed to [facility] 12/7/23.

## 2024-01-25 ENCOUNTER — TELEPHONE (OUTPATIENT)
Dept: FAMILY MEDICINE CLINIC | Facility: CLINIC | Age: 54
End: 2024-01-25

## 2024-01-30 PROBLEM — Z12.11 COLON CANCER SCREENING: Status: RESOLVED | Noted: 2023-12-01 | Resolved: 2024-01-30

## 2024-01-30 PROBLEM — Z13.6 SCREENING FOR CARDIOVASCULAR CONDITION: Status: RESOLVED | Noted: 2023-12-01 | Resolved: 2024-01-30

## 2024-01-30 PROBLEM — Z12.5 PROSTATE CANCER SCREENING: Status: RESOLVED | Noted: 2023-12-01 | Resolved: 2024-01-30

## 2024-02-21 PROBLEM — Z00.00 HEALTHCARE MAINTENANCE: Status: RESOLVED | Noted: 2019-02-20 | Resolved: 2024-02-21
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
## 2024-08-19 ENCOUNTER — TELEPHONE (OUTPATIENT)
Dept: FAMILY MEDICINE CLINIC | Facility: CLINIC | Age: 54
End: 2024-08-19

## 2024-08-26 ENCOUNTER — OFFICE VISIT (OUTPATIENT)
Dept: FAMILY MEDICINE CLINIC | Facility: CLINIC | Age: 54
End: 2024-08-26

## 2024-08-26 VITALS
DIASTOLIC BLOOD PRESSURE: 82 MMHG | OXYGEN SATURATION: 98 % | SYSTOLIC BLOOD PRESSURE: 123 MMHG | RESPIRATION RATE: 18 BRPM | TEMPERATURE: 98.3 F | HEART RATE: 81 BPM | BODY MASS INDEX: 31.44 KG/M2 | HEIGHT: 71 IN | WEIGHT: 224.6 LBS

## 2024-08-26 DIAGNOSIS — N52.8 OTHER MALE ERECTILE DYSFUNCTION: Primary | ICD-10-CM

## 2024-08-26 PROCEDURE — 99213 OFFICE O/P EST LOW 20 MIN: CPT | Performed by: FAMILY MEDICINE

## 2024-08-26 NOTE — PROGRESS NOTES
"Ambulatory Visit  Name: Jimmy Chaparro      : 1970      MRN: [id number]  Encounter Provider: Leni Rodríguez MD  Encounter Date: 2024   Encounter department: [facility] [place]    Assessment & Plan   1. Other male erectile dysfunction  Assessment & Plan:  - 2-month history of erectile dysfunction.  Per patient he recently got into a new relationship, his last relationship ended in October and per patient was a really \"bad break-up\"  - Patient endorsing nocturnal erections, patient with no known history of hypertension, PAD or diabetes.  Does endorse a history of smoking. Denies any penile discharge, problems urinating, weight loss, scrotum pain.  - Will order lab work to rule out secondary causes of erectile dysfunction (patient also has previously ordered lab work that patient was instructed to get).  Talked in great length with patient about the possibility that this may be due to a psychogenic cause given that he does have erectile that night and was noted to have normal erections until recent relationship 2 months ago.   - Talk to patient about different options such as DBT versus ED medication.  Instructed patient that if it is due to a psychogenic hide that he would benefit from therapy.  Patient offered therapy.  Patient declines therapy currently at this time.  - Patient will be seen Friday at which time he will discuss about lab work and best next steps.   Orders:  -     TSH, 3rd generation; Future  -     T4; Future       History of Present Illness     Patient here for evaluation for erectile dysfunction.  Per patient his symptoms started 2 months ago when he was introduced to a new partner.  Prior patient had a break-up in October which she reports that with \"really bad break-up\".  Patient does report nocturnal erection.  In states that up until his new relationship he had no problems with erections.  Denies any penile discharge, problems urinating, " scrotum pain.        Review of Systems   Constitutional:  Negative for chills and fever.   HENT:  Negative for ear pain and sore throat.    Eyes:  Negative for pain and visual disturbance.   Respiratory:  Negative for cough and shortness of breath.    Cardiovascular:  Negative for chest pain and palpitations.   Gastrointestinal:  Negative for abdominal pain and vomiting.   Endocrine: Negative for polydipsia, polyphagia and polyuria.   Genitourinary:  Negative for dysuria, enuresis, genital sores, hematuria, penile discharge, penile pain, penile swelling, scrotal swelling and testicular pain.   Musculoskeletal:  Negative for arthralgias and back pain.   Skin:  Negative for color change and rash.   Neurological:  Negative for syncope, light-headedness and headaches.   All other systems reviewed and are negative.    Current Outpatient Medications on File Prior to Visit   Medication Sig Dispense Refill    acetaminophen (TYLENOL) 650 mg CR tablet Take 1 tablet (650 mg total) by mouth every 8 (eight) hours as needed for mild pain (Patient not taking: Reported on 12/1/2023) 30 tablet 0    gabapentin (NEURONTIN) 100 mg capsule Take 1 capsule by mouth 3 (three) times a day (Patient not taking: Reported on 3/25/2022)      ibuprofen (MOTRIN) 600 mg tablet Take 1 tablet (600 mg total) by mouth every 6 (six) hours as needed for moderate pain (Patient not taking: Reported on 12/1/2023) 30 tablet 0    lidocaine (LMX) 4 % cream Apply topically daily as needed for mild pain (Patient not taking: Reported on 11/17/2023) 5 g 0    methocarbamol (ROBAXIN) 500 mg tablet Take 1 tablet (500 mg total) by mouth 3 (three) times a day as needed for muscle spasms (Patient not taking: Reported on 8/26/2024) 15 tablet 0    naproxen (NAPROSYN) 500 mg tablet Take 1 tablet (500 mg total) by mouth 2 (two) times a day with meals (Patient not taking: Reported on 8/26/2024) 20 tablet 0     No current facility-administered medications on file prior to  "visit.      Social History     Tobacco Use    Smoking status: Every Day     Current packs/day: 1.00     Average packs/day: 1 pack/day for 1 year (1.0 ttl pk-yrs)     Types: Cigarettes    Smokeless tobacco: Never   Vaping Use    Vaping status: Never Used   Substance and Sexual Activity    Alcohol use: Not Currently     Comment: Pt \"very little'    Drug use: No    Sexual activity: Yes     Partners: Female     Objective     /82 (BP Location: Right arm, Patient Position: Sitting, Cuff Size: Standard)   Pulse 81   Temp 98.3 °F (36.8 °C) (Temporal)   Resp 18   Ht 5' 11\" (1.803 m)   Wt 102 kg (224 lb 9.6 oz)   SpO2 98%   BMI 31.33 kg/m²     Physical Exam  Vitals and nursing note reviewed.   Constitutional:       General: He is not in acute distress.     Appearance: He is well-developed.   HENT:      Mouth/Throat:      Mouth: Mucous membranes are moist.   Eyes:      Extraocular Movements: Extraocular movements intact.      Conjunctiva/sclera: Conjunctivae normal.   Cardiovascular:      Rate and Rhythm: Normal rate and regular rhythm.      Heart sounds: No murmur heard.  Pulmonary:      Effort: Pulmonary effort is normal. No respiratory distress.      Breath sounds: Normal breath sounds.   Abdominal:      Palpations: Abdomen is soft.      Tenderness: There is no abdominal tenderness.   Musculoskeletal:         General: No swelling.   Skin:     General: Skin is warm and dry.      Capillary Refill: Capillary refill takes less than 2 seconds.   Neurological:      Mental Status: He is alert.   Psychiatric:         Mood and Affect: Mood normal.         "

## 2024-08-26 NOTE — ASSESSMENT & PLAN NOTE
"- 2-month history of erectile dysfunction.  Per patient he recently got into a new relationship, his last relationship ended in October and per patient was a really \"bad break-up\"  - Patient endorsing nocturnal erections, patient with no known history of hypertension, PAD or diabetes.  Does endorse a history of smoking. Denies any penile discharge, problems urinating, weight loss, scrotum pain.  - Will order lab work to rule out secondary causes of erectile dysfunction (patient also has previously ordered lab work that patient was instructed to get).  Talked in great length with patient about the possibility that this may be due to a psychogenic cause given that he does have erectile that night and was noted to have normal erections until recent relationship 2 months ago.   - Talk to patient about different options such as DBT versus ED medication.  Instructed patient that if it is due to a psychogenic hide that he would benefit from therapy.  Patient offered therapy.  Patient declines therapy currently at this time.  - Patient will be seen Friday at which time he will discuss about lab work and best next steps.   "

## 2024-08-27 ENCOUNTER — LAB (OUTPATIENT)
Dept: LAB | Facility: HOSPITAL | Age: 54
End: 2024-08-27
Payer: COMMERCIAL

## 2024-08-27 DIAGNOSIS — Z13.6 SCREENING FOR CARDIOVASCULAR CONDITION: ICD-10-CM

## 2024-08-27 DIAGNOSIS — N52.8 OTHER MALE ERECTILE DYSFUNCTION: Primary | ICD-10-CM

## 2024-08-27 DIAGNOSIS — Z12.5 PROSTATE CANCER SCREENING: ICD-10-CM

## 2024-08-27 LAB
ANION GAP SERPL CALCULATED.3IONS-SCNC: 7 MMOL/L (ref 4–13)
BUN SERPL-MCNC: 10 MG/DL (ref 5–25)
CALCIUM SERPL-MCNC: 9.6 MG/DL (ref 8.4–10.2)
CHLORIDE SERPL-SCNC: 103 MMOL/L (ref 96–108)
CHOLEST SERPL-MCNC: 161 MG/DL
CO2 SERPL-SCNC: 29 MMOL/L (ref 21–32)
CREAT SERPL-MCNC: 1.16 MG/DL (ref 0.6–1.3)
ERYTHROCYTE [DISTWIDTH] IN BLOOD BY AUTOMATED COUNT: 17 % (ref 11.6–15.1)
EST. AVERAGE GLUCOSE BLD GHB EST-MCNC: 114 MG/DL
GFR SERPL CREATININE-BSD FRML MDRD: 71 ML/MIN/1.73SQ M
GLUCOSE P FAST SERPL-MCNC: 81 MG/DL (ref 65–99)
HBA1C MFR BLD: 5.6 %
HCT VFR BLD AUTO: 45.6 % (ref 36.5–49.3)
HDLC SERPL-MCNC: 36 MG/DL
HGB BLD-MCNC: 14.5 G/DL (ref 12–17)
LDLC SERPL CALC-MCNC: 108 MG/DL (ref 0–100)
MCH RBC QN AUTO: 23.8 PG (ref 26.8–34.3)
MCHC RBC AUTO-ENTMCNC: 31.8 G/DL (ref 31.4–37.4)
MCV RBC AUTO: 75 FL (ref 82–98)
PLATELET # BLD AUTO: 199 THOUSANDS/UL (ref 149–390)
PMV BLD AUTO: 10.2 FL (ref 8.9–12.7)
POTASSIUM SERPL-SCNC: 4.5 MMOL/L (ref 3.5–5.3)
PSA FREE MFR SERPL: 23.99 %
PSA FREE SERPL-MCNC: 0.27 NG/ML
PSA SERPL-MCNC: 1.14 NG/ML (ref 0–4)
RBC # BLD AUTO: 6.08 MILLION/UL (ref 3.88–5.62)
SODIUM SERPL-SCNC: 139 MMOL/L (ref 135–147)
T4 SERPL-MCNC: 5.25 UG/DL (ref 6.09–12.23)
TRIGL SERPL-MCNC: 83 MG/DL
TSH SERPL DL<=0.05 MIU/L-ACNC: 1.44 UIU/ML (ref 0.45–4.5)
WBC # BLD AUTO: 8.37 THOUSAND/UL (ref 4.31–10.16)

## 2024-08-27 PROCEDURE — 36415 COLL VENOUS BLD VENIPUNCTURE: CPT

## 2024-08-27 PROCEDURE — 84436 ASSAY OF TOTAL THYROXINE: CPT

## 2024-08-27 PROCEDURE — 80061 LIPID PANEL: CPT

## 2024-08-27 PROCEDURE — 85027 COMPLETE CBC AUTOMATED: CPT

## 2024-08-27 PROCEDURE — 84153 ASSAY OF PSA TOTAL: CPT

## 2024-08-27 PROCEDURE — 84443 ASSAY THYROID STIM HORMONE: CPT

## 2024-08-27 PROCEDURE — 84154 ASSAY OF PSA FREE: CPT

## 2024-08-27 PROCEDURE — 80048 BASIC METABOLIC PNL TOTAL CA: CPT

## 2024-08-27 PROCEDURE — 83036 HEMOGLOBIN GLYCOSYLATED A1C: CPT

## 2024-08-28 ENCOUNTER — TELEPHONE (OUTPATIENT)
Dept: FAMILY MEDICINE CLINIC | Facility: CLINIC | Age: 54
End: 2024-08-28

## 2024-08-28 DIAGNOSIS — R71.8 MICROCYTIC ERYTHROCYTES: ICD-10-CM

## 2024-08-28 DIAGNOSIS — R79.89 ABNORMAL SERUM THYROXINE (T4) LEVEL: Primary | ICD-10-CM

## 2024-08-28 NOTE — TELEPHONE ENCOUNTER
Called patient and discussed lab result. Will re-order TSH and T4 (by dialysis to ensure measurement is accurate). Talked about possible doing antibodies as next steps. Also ordered Iron panel given low MCV. Patient had follow-up on Friday with Dr. Guy as well.

## 2024-08-29 ENCOUNTER — APPOINTMENT (OUTPATIENT)
Dept: LAB | Facility: HOSPITAL | Age: 54
End: 2024-08-29
Payer: COMMERCIAL

## 2024-08-29 DIAGNOSIS — R79.89 ABNORMAL SERUM THYROXINE (T4) LEVEL: ICD-10-CM

## 2024-08-29 DIAGNOSIS — R71.8 MICROCYTIC ERYTHROCYTES: ICD-10-CM

## 2024-08-29 LAB
FERRITIN SERPL-MCNC: 49 NG/ML (ref 24–336)
IRON SATN MFR SERPL: 28 % (ref 15–50)
IRON SERPL-MCNC: 80 UG/DL (ref 50–212)
TIBC SERPL-MCNC: 283 UG/DL (ref 250–450)
TSH SERPL DL<=0.05 MIU/L-ACNC: 1.23 UIU/ML (ref 0.45–4.5)
UIBC SERPL-MCNC: 203 UG/DL (ref 155–355)

## 2024-08-29 PROCEDURE — 84439 ASSAY OF FREE THYROXINE: CPT

## 2024-08-29 PROCEDURE — 82728 ASSAY OF FERRITIN: CPT

## 2024-08-29 PROCEDURE — 83550 IRON BINDING TEST: CPT

## 2024-08-29 PROCEDURE — 83540 ASSAY OF IRON: CPT

## 2024-08-29 PROCEDURE — 36415 COLL VENOUS BLD VENIPUNCTURE: CPT

## 2024-08-29 PROCEDURE — 84443 ASSAY THYROID STIM HORMONE: CPT

## 2024-08-30 ENCOUNTER — OFFICE VISIT (OUTPATIENT)
Dept: FAMILY MEDICINE CLINIC | Facility: CLINIC | Age: 54
End: 2024-08-30

## 2024-08-30 VITALS
HEART RATE: 71 BPM | BODY MASS INDEX: 31.44 KG/M2 | WEIGHT: 225.4 LBS | TEMPERATURE: 98.3 F | DIASTOLIC BLOOD PRESSURE: 79 MMHG | OXYGEN SATURATION: 97 % | RESPIRATION RATE: 18 BRPM | SYSTOLIC BLOOD PRESSURE: 124 MMHG

## 2024-08-30 DIAGNOSIS — N52.9 ERECTILE DYSFUNCTION, UNSPECIFIED ERECTILE DYSFUNCTION TYPE: ICD-10-CM

## 2024-08-30 DIAGNOSIS — F17.200 TOBACCO DEPENDENCE: Primary | ICD-10-CM

## 2024-08-30 PROCEDURE — 99214 OFFICE O/P EST MOD 30 MIN: CPT | Performed by: FAMILY MEDICINE

## 2024-08-30 RX ORDER — NICOTINE 21 MG/24HR
1 PATCH, TRANSDERMAL 24 HOURS TRANSDERMAL EVERY 24 HOURS
Qty: 28 PATCH | Refills: 3 | Status: SHIPPED | OUTPATIENT
Start: 2024-08-30

## 2024-08-30 RX ORDER — SILDENAFIL 25 MG/1
25 TABLET, FILM COATED ORAL DAILY PRN
Qty: 15 TABLET | Refills: 0 | Status: SHIPPED | OUTPATIENT
Start: 2024-08-30

## 2024-08-30 NOTE — ASSESSMENT & PLAN NOTE
Recent lab work not showing any specific physiologic contributors.  Reasonable at this time to trial as needed dosage of Viagra to elicit response.  Patient likely has contribution from his significant smoking history that is contributing to his erectile dysfunction. See plan above

## 2024-08-30 NOTE — ASSESSMENT & PLAN NOTE
54-year-old male patient has been smoking a pack a day for the past few decades.  At this point he does qualify for CT low-dose cancer screening.  I discussed this in depth today with the patient I also heavily counseled him in regards to smoking cessation.  I do suspect that this significant nicotine use may be impacting his erectile dysfunction.  I stated that as he decreases his cigarette use over time his erectile dysfunction may actually get quite better.  I do suspect he has some degree of microvascular atherosclerosis or peripheral artery disease leading to this.  However, he does endorse nocturnal erections and waking up in the morning with erections as well which demonstrates normal physiologic function.  Recent lab testing rules out most metabolic and physiologic contributors although with his smoking history I cannot exclude microvascular arterial insult as a contributor for his erectile dysfunction.    The patient is agreeable to try nicotine patches so I will send him with a maximum strength 1 patch per day I heavily counseled him that he cannot smoke while doing a nicotine patch at the same time.

## 2024-08-30 NOTE — PROGRESS NOTES
Ambulatory Visit  Name: Jimmy Chaparro      : 1970      MRN: [id number]  Encounter Provider: Francisco Guy DO  Encounter Date: 2024   Encounter department: [facility]    Assessment & Plan   1. Tobacco dependence  Assessment & Plan:  54-year-old male patient has been smoking a pack a day for the past few decades.  At this point he does qualify for CT low-dose cancer screening.  I discussed this in depth today with the patient I also heavily counseled him in regards to smoking cessation.  I do suspect that this significant nicotine use may be impacting his erectile dysfunction.  I stated that as he decreases his cigarette use over time his erectile dysfunction may actually get quite better.  I do suspect he has some degree of microvascular atherosclerosis or peripheral artery disease leading to this.  However, he does endorse nocturnal erections and waking up in the morning with erections as well which demonstrates normal physiologic function.  Recent lab testing rules out most metabolic and physiologic contributors although with his smoking history I cannot exclude microvascular arterial insult as a contributor for his erectile dysfunction.    The patient is agreeable to try nicotine patches so I will send him with a maximum strength 1 patch per day I heavily counseled him that he cannot smoke while doing a nicotine patch at the same time.      Orders:  -     CT lung screening program; Future; Expected date: 2024  -     nicotine (NICODERM CQ) 21 mg/24 hr TD 24 hr patch; Place 1 patch on the skin over 24 hours every 24 hours  2. Erectile dysfunction, unspecified erectile dysfunction type  Assessment & Plan:  Recent lab work not showing any specific physiologic contributors.  Reasonable at this time to trial as needed dosage of Viagra to elicit response.  Patient likely has contribution from his significant smoking history that is contributing to his erectile  dysfunction. See plan above   Orders:  -     sildenafil (VIAGRA) 25 MG tablet; Take 1 tablet (25 mg total) by mouth daily as needed for erectile dysfunction       History of Present Illness     The patient is a 54 year old male who complains of erectile dysfunction. The patient returns today to discuss his blood work. The patient states he still has nocturnal erections. The patient denies any chest pain, shortness of breath, fever, chills, recent weight loss, nausea, vomiting, diarrhea, or abdominal pain.  He smokes a significant amount of cigarettes including a pack a day for the past few decades.  He does qualify for CT low-dose chest screening to evaluate for pulmonary nodules.  Otherwise he is well-appearing and is anxious to review his lab results today.      Review of Systems   Constitutional:  Negative for chills and fever.   Respiratory:  Negative for shortness of breath.    Cardiovascular:  Negative for chest pain and leg swelling.   Gastrointestinal:  Negative for abdominal pain, blood in stool, diarrhea, nausea and vomiting.   Endocrine: Negative for cold intolerance and heat intolerance.   Genitourinary:  Negative for hematuria.   Musculoskeletal:  Negative for arthralgias and myalgias.   Skin:  Negative for rash.   Neurological:  Negative for dizziness, syncope, numbness and headaches.     Current Outpatient Medications on File Prior to Visit   Medication Sig Dispense Refill    acetaminophen (TYLENOL) 650 mg CR tablet Take 1 tablet (650 mg total) by mouth every 8 (eight) hours as needed for mild pain (Patient not taking: Reported on 12/1/2023) 30 tablet 0    gabapentin (NEURONTIN) 100 mg capsule Take 1 capsule by mouth 3 (three) times a day (Patient not taking: Reported on 3/25/2022)      ibuprofen (MOTRIN) 600 mg tablet Take 1 tablet (600 mg total) by mouth every 6 (six) hours as needed for moderate pain (Patient not taking: Reported on 12/1/2023) 30 tablet 0    lidocaine (LMX) 4 % cream Apply  topically daily as needed for mild pain (Patient not taking: Reported on 11/17/2023) 5 g 0    methocarbamol (ROBAXIN) 500 mg tablet Take 1 tablet (500 mg total) by mouth 3 (three) times a day as needed for muscle spasms (Patient not taking: Reported on 8/26/2024) 15 tablet 0    naproxen (NAPROSYN) 500 mg tablet Take 1 tablet (500 mg total) by mouth 2 (two) times a day with meals (Patient not taking: Reported on 8/26/2024) 20 tablet 0     No current facility-administered medications on file prior to visit.      Objective     /79   Pulse 71   Temp 98.3 °F (36.8 °C)   Resp 18   Wt 102 kg (225 lb 6.4 oz)   SpO2 97%   BMI 31.44 kg/m²     Physical Exam  Constitutional:       General: He is not in acute distress.     Appearance: Normal appearance. He is normal weight.   HENT:      Head: Normocephalic and atraumatic.      Right Ear: External ear normal.      Left Ear: External ear normal.      Nose: Nose normal.      Mouth/Throat:      Pharynx: Oropharynx is clear.   Eyes:      Conjunctiva/sclera: Conjunctivae normal.   Cardiovascular:      Rate and Rhythm: Normal rate and regular rhythm.      Pulses: Normal pulses.      Heart sounds: Normal heart sounds.   Pulmonary:      Effort: Pulmonary effort is normal.      Breath sounds: Normal breath sounds.   Abdominal:      General: Abdomen is flat. Bowel sounds are normal.      Palpations: Abdomen is soft.   Musculoskeletal:         General: No swelling or tenderness. Normal range of motion.      Cervical back: Normal range of motion and neck supple.   Skin:     General: Skin is warm.      Capillary Refill: Capillary refill takes less than 2 seconds.   Neurological:      General: No focal deficit present.      Mental Status: He is alert and oriented to person, place, and time. Mental status is at baseline.   Psychiatric:         Mood and Affect: Mood normal.         Behavior: Behavior normal.         Thought Content: Thought content normal.         Judgment: Judgment  normal.       Administrative Statements   I have spent a total time of 30 minutes in caring for this patient on the day of the visit/encounter including Diagnostic results, Prognosis, Risks and benefits of tx options, Instructions for management, Patient and family education, Importance of tx compliance, Risk factor reductions, Impressions, Documenting in the medical record, Reviewing / ordering tests, medicine, procedures  , and Obtaining or reviewing history  .    Francisco Guy,   PGY-2 Family Medicine - [place]   08/30/24, 7:10 PM

## 2024-09-04 ENCOUNTER — TELEPHONE (OUTPATIENT)
Dept: INTERNAL MEDICINE CLINIC | Facility: CLINIC | Age: 54
End: 2024-09-04

## 2024-09-04 NOTE — TELEPHONE ENCOUNTER
Prior authorization initiated for patient's Sildenafil Citrate tablets on Cover my Meds. Pending review.     Key: [id number]

## 2024-09-06 NOTE — TELEPHONE ENCOUNTER
Patient's prior authorization for Sildenafil denied.     Medicine denied due to not having an organic cause of erectile dysfunction such as diabetes mellitus, hypertension, atherosclerosis, drug induced, hypercholesterolemia, renal insufficiency, neurological disease (stroke, seizure, spinal cord injury), vascular or neurologic disease affecting the genitalia or genital surgery.     Please review and advise. Patient may pay out of pocket or alternate medication.

## 2024-09-08 LAB — T4 FREE SERPL DIALY-MCNC: 0.59 NG/DL

## 2024-10-02 ENCOUNTER — TELEPHONE (OUTPATIENT)
Dept: PEDIATRICS CLINIC | Facility: CLINIC | Age: 54
End: 2024-10-02

## 2024-10-02 DIAGNOSIS — R79.89 LOW SERUM T4 LEVEL: Primary | ICD-10-CM

## 2024-10-02 NOTE — TELEPHONE ENCOUNTER
Called patient to see if pt was able to get f/u T4. Pt report that he didn't realized he needed repeat t4. Instructed patient that I wanted him to get the repeat blood work and will add/re-order TSH, FH, LH, Testerone and T4 done given low t4. Instructed patient that if anything comes back abnormal he will need to see endocrine for further work up.

## 2024-11-21 ENCOUNTER — TELEPHONE (OUTPATIENT)
Dept: FAMILY MEDICINE CLINIC | Facility: CLINIC | Age: 54
End: 2024-11-21